# Patient Record
Sex: FEMALE | Race: WHITE | NOT HISPANIC OR LATINO | Employment: PART TIME | URBAN - NONMETROPOLITAN AREA
[De-identification: names, ages, dates, MRNs, and addresses within clinical notes are randomized per-mention and may not be internally consistent; named-entity substitution may affect disease eponyms.]

---

## 2021-02-17 ENCOUNTER — TELEPHONE (OUTPATIENT)
Dept: PAIN MEDICINE | Facility: CLINIC | Age: 51
End: 2021-02-17

## 2021-02-17 ENCOUNTER — CONSULT (OUTPATIENT)
Dept: PAIN MEDICINE | Facility: CLINIC | Age: 51
End: 2021-02-17
Payer: COMMERCIAL

## 2021-02-17 VITALS
BODY MASS INDEX: 23.39 KG/M2 | DIASTOLIC BLOOD PRESSURE: 88 MMHG | RESPIRATION RATE: 20 BRPM | SYSTOLIC BLOOD PRESSURE: 142 MMHG | HEART RATE: 59 BPM | TEMPERATURE: 97.9 F | WEIGHT: 116 LBS | HEIGHT: 59 IN

## 2021-02-17 DIAGNOSIS — F41.9 ANXIETY: Primary | ICD-10-CM

## 2021-02-17 DIAGNOSIS — M54.16 LUMBAR RADICULOPATHY: Primary | ICD-10-CM

## 2021-02-17 PROCEDURE — 99244 OFF/OP CNSLTJ NEW/EST MOD 40: CPT | Performed by: ANESTHESIOLOGY

## 2021-02-17 RX ORDER — CELECOXIB 200 MG/1
200 CAPSULE ORAL 2 TIMES DAILY
Qty: 60 CAPSULE | Refills: 0 | Status: SHIPPED | OUTPATIENT
Start: 2021-02-17 | End: 2021-02-17 | Stop reason: SDUPTHER

## 2021-02-17 RX ORDER — 0.9 % SODIUM CHLORIDE 0.9 %
1 VIAL (ML) INJECTION ONCE
Status: CANCELLED | OUTPATIENT
Start: 2021-02-17 | End: 2021-02-17

## 2021-02-17 RX ORDER — ATENOLOL 50 MG/1
50 TABLET ORAL 2 TIMES DAILY
COMMUNITY
Start: 2020-12-13

## 2021-02-17 RX ORDER — GABAPENTIN 100 MG/1
100 CAPSULE ORAL 3 TIMES DAILY
Qty: 90 CAPSULE | Refills: 0 | Status: SHIPPED | OUTPATIENT
Start: 2021-02-17 | End: 2021-05-14

## 2021-02-17 RX ORDER — CELECOXIB 200 MG/1
200 CAPSULE ORAL 2 TIMES DAILY
Qty: 60 CAPSULE | Refills: 0 | Status: SHIPPED | OUTPATIENT
Start: 2021-02-17 | End: 2021-05-14

## 2021-02-17 RX ORDER — ERGOCALCIFEROL 1.25 MG/1
CAPSULE ORAL
COMMUNITY
Start: 2021-02-13 | End: 2021-05-14

## 2021-02-17 RX ORDER — GABAPENTIN 100 MG/1
100 CAPSULE ORAL 3 TIMES DAILY
Qty: 90 CAPSULE | Refills: 0 | Status: SHIPPED | OUTPATIENT
Start: 2021-02-17 | End: 2021-02-17 | Stop reason: SDUPTHER

## 2021-02-17 RX ORDER — ALPRAZOLAM 0.5 MG/1
0.5 TABLET ORAL ONCE AS NEEDED
Qty: 2 TABLET | Refills: 0 | Status: SHIPPED | OUTPATIENT
Start: 2021-02-17 | End: 2021-05-14

## 2021-02-17 NOTE — PATIENT INSTRUCTIONS
Lumbar Radiculopathy   WHAT YOU NEED TO KNOW:   Lumbar radiculopathy is a painful condition that happens when a nerve in your lumbar spine (lower back) is pinched or irritated  Nerves control feeling and movement in your body  You may have numbness or pain that shoots down from your lower back towards your foot  DISCHARGE INSTRUCTIONS:   Medicines:   · Medicines:     ? NSAIDs , such as ibuprofen, help decrease swelling, pain, and fever  This medicine is available with or without a doctor's order  NSAIDs can cause stomach bleeding or kidney problems in certain people  If you take blood thinner medicine, always ask your healthcare provider if NSAIDs are safe for you  Always read the medicine label and follow directions  ? Muscle relaxers  help decrease pain and muscle spasms  ? Opioids: This is a strong medicine given to reduce severe pain  It is also called narcotic pain medicine  Take this medicine exactly as directed by your healthcare provider  ? Oral steroids: Steroids may also be given to reduce pain and swelling  ? Take your medicine as directed  Contact your healthcare provider if you think your medicine is not helping or if you have side effects  Tell him of her if you are allergic to any medicine  Keep a list of the medicines, vitamins, and herbs you take  Include the amounts, and when and why you take them  Bring the list or the pill bottles to follow-up visits  Carry your medicine list with you in case of an emergency  Follow up with your healthcare provider or spine specialist within 1 to 3 weeks:  After your first follow-up appointment, return to your healthcare provider or spine specialist every 2 weeks until you have healed  Ask for information about physical therapy for your condition  Write down your questions so you remember to ask them during your visits  Physical therapy:  You may need physical therapy to improve your condition   Your physical therapist may teach you certain exercises to improve posture (the way you stand and sit), flexibility, and strength in your lower back  Self care:   · Stay active: It is best to be active when you have lumbar radiculopathy  Your physical therapist or healthcare provider may tell you to take walks to ease yourself back into your daily routine  Avoid long periods of bed rest  Bed rest could worsen your symptoms  Do not move in ways that increase your pain  Ask for more information about the best ways to stay active  · Use ice or heat packs:  Use ice or heat packs as directed on the sore area of your body to decrease the pain and swelling  Put ice in a plastic bag covered with a towel on your low back  Cover heated items with a towel to avoid burns  Use ice and heat as directed  · Avoid heavy lifting: Your condition may worsen if you lift heavy things  Avoid lifting if possible  · Maintain a healthy weight:  Excess body weight may strain your back  Talk with your healthcare provider about ways to lose excess weight if you are overweight  Contact your healthcare provider or spine specialist if:   · Your pain does not improve within 1 to 3 weeks after treatment  · Your pain and weakness keep you from your normal activities at work, home, or school  · You lose more than 10 pounds in 6 months without trying  · You become depressed or sad because of the pain  · You have questions or concerns about your condition or care  Return to the emergency department if:   · You have a fever greater than 100 4°F for longer than 2 days  · You have new, severe back or leg pain, or your pain spreads to both legs  · You have any new signs of numbness or weakness, especially in your lower back, legs, arms, or genital area  · You have new trouble controlling your urine and bowel movements  · You do not feel like your bladder empties when you urinate      © Copyright [employer] 2020 Information is for End User's use only and may not be sold, redistributed or otherwise used for commercial purposes  All illustrations and images included in CareNotes® are the copyrighted property of [employer]  or [employer]  The above information is an  only  It is not intended as medical advice for individual conditions or treatments  Talk to your doctor, nurse or pharmacist before following any medical regimen to see if it is safe and effective for you  Core Strengthening Exercises   WHAT YOU NEED TO KNOW:   Your core includes the muscles of your lower back, hip, pelvis, and abdomen  Core strengthening exercises help heal and strengthen these muscles  This helps prevent another injury, and keeps your pelvis, spine, and hips in the correct position  DISCHARGE INSTRUCTIONS:   Contact your healthcare provider if:   · You have sharp or worsening pain during exercise or at rest     · You have questions or concerns about your shoulder exercises  Safety tips:  Talk to your healthcare provider before you start an exercise program  A physical therapist can teach you how to do core strengthening exercises safely  · Do the exercises on a mat or firm surface  A firm surface will support your spine and prevent low back pain  Do not do these exercises on a bed  · Move slowly and smoothly  Avoid fast or jerky motions  · Stop if you feel pain  Core exercises should not be painful  Stop if you feel pain  · Breathe normally during core exercises  Do not hold your breath  This may cause an increase in blood pressure and prevent muscle strengthening  Your healthcare provider will tell you when to inhale and exhale during the exercise  · Begin all of your exercises with abdominal bracing  Abdominal bracing helps warm up your core muscles  You can also practice abdominal bracing throughout the day  Lie on your back with your knees bent and feet flat on the floor  Place your arms in a relaxed position beside your body  Tighten your abdominal muscles  Pull your belly button in and up toward your spine  Hold for 5 seconds  Relax your muscles  Repeat 10 times  Core strengthening exercises: Your healthcare provider will tell you how often to do these exercises  The provider will also tell you how many repetitions of each exercise you should do  Hold each exercise for 5 seconds or as directed  As you get stronger, increase your hold to 10 to 15 seconds  You can do some of these exercises on a stability ball, or with a weight  Ask your healthcare provider how to use a stability ball or weight for these exercises:  · Bridging:  Lie on your back with your knees bent and feet flat on the floor  Rest your arms at your side  Tighten your buttocks, and then lift your hips 1 inch off the floor  Hold for 5 seconds  When you can do this exercise without pain for 10 seconds, increase the distance you lift your hips  A good goal is to be able to lift your hips so that your shoulders, hips, and knees are in a straight line  · Dead bug:  Lie on your back with your knees bent and feet flat on the floor  Place your arms in a relaxed position beside your body  Begin with abdominal bracing  Next, raise one leg, keeping your knee bent  Hold for 5 seconds  Repeat with the other leg  When you can do this exercise without pain for 10 to 15 seconds, you may raise one straight leg and hold  Repeat with the other leg  · Quadruped:  Place your hands and knees on the floor  Keep your wrists directly below your shoulders and your knees directly below your hips  Pull your belly button in toward your spine  Do not flatten or arch your back  Tighten your abdominal muscles below your belly button  Hold for 5 seconds  When you can do this exercise without pain for 10 to 15 seconds, you may extend one arm and hold  Repeat on the other side           · Side bridge exercises:      ? Standing side bridge:  Stand next to a wall and extend one arm toward the wall  Place your palm flat on the wall with your fingers pointing upward  Begin with abdominal bracing  Next, without moving your feet, slowly bend your arm to 90 degrees  Hold for 5 seconds  Repeat on the other side  When you can do this exercise without pain for 10 to 15 seconds, you may do the bent leg side bridge on the floor  ? Bent leg side bridge:  Lie on one side with your legs, hips, and shoulders in a straight line  Prop yourself up onto your forearm so your elbow is directly below your shoulder  Bend your knees back to 90 degrees  Begin with abdominal bracing  Next, lift your hips and balance yourself on your forearm and knees  Hold for 5 seconds  Repeat on the other side  When you can do this exercise without pain for 10 to 15 seconds, you may do the straight leg side bridge on the floor  ? Straight leg side bridge:  Lie on one side with your legs, hips, and shoulders in a straight line  Prop yourself up onto your forearm so your elbow is directly below your shoulder  Begin with abdominal bracing  Lift your hips off the floor and balance yourself on your forearm and the outside of your flexed foot  Do not let your ankle bend sideways  Hold for 5 seconds  Repeat on the other side  When you can do this exercise without pain for 10 to 15 seconds, ask your healthcare provider for more advanced exercises  · Superman:  Lie on your stomach  Extend your arms forward on the floor  Tighten your abdominal muscles and lift your right hand and left leg off the floor  Hold this position  Slowly return to the starting position  Tighten your abdominal muscles and lift your left hand and right leg off the floor  Hold this position  Slowly return to the starting position  · Clam:  Lie on your side with your knees bent  Put your bottom arm under your head to keep your neck in line  Put your top hand on your hip to keep your pelvis from moving   Put your heels together, and keep them together during this exercise  Slowly raise your top knee toward the ceiling  Then lower your leg so your knees are together  Repeat this exercise 10 times  Then switch sides and do the exercise 10 times with the other leg  · Curl up:  Lie on your back with your knees bent and feet flat on the floor  Place your hands, palms down, underneath your lower back  Next, with your elbows on the floor, lift your shoulders and chest 2 to 3 inches off the floor  Keep your head in line with your shoulders  Hold this position  Slowly return to the starting position  · Straight leg raises:  Lie on your back with one leg straight  Bend the other knee and place your foot flat on the floor  Tighten your abdominal muscles  Keep your leg straight and slowly lift it straight up 6 to 12 inches off the floor  Hold this position  Lower your leg slowly  Do as many repetitions as directed on this side  Repeat with the other leg  · Plank:  Lie on your stomach  Bend your elbows and place your forearms flat on the floor  Lift your chest, stomach, and knees off the floor  Make sure your elbows are below your shoulders  Your body should be in a straight line  Do not let your hips or lower back sink to the ground  Squeeze your abdominal muscles together and hold for 15 seconds  To make this exercise harder, hold for 30 seconds or lift 1 leg at a time  · Bicycles:  Lie on your back  Bend both knees and bring them toward your chest  Your calves should be parallel to the floor  Place the palms of your hands on the back of your head  Straighten your right leg and keep it lifted 2 inches off the floor  Raise your head and shoulders off the floor and twist towards your left  Keep your head and shoulders lifted  Bend your right knee while you straighten your left leg  Keep your left leg 2 inches off the floor  Twist your head and chest towards the left leg   Continue to straighten 1 leg at a time and twist        Follow up with your healthcare provider as directed:  Write down your questions so you remember to ask them during your visits  © Copyright [address] Information is for End User's use only and may not be sold, redistributed or otherwise used for commercial purposes  All illustrations and images included in CareNotes® are the copyrighted property of [employer]  or [employer]  The above information is an  only  It is not intended as medical advice for individual conditions or treatments  Talk to your doctor, nurse or pharmacist before following any medical regimen to see if it is safe and effective for you

## 2021-02-17 NOTE — LETTER
February 18, 2021     Lashay Moe DO  [address] [facility] [address]    Patient: Cathie Henry   YOB: 1970   Date of Visit: 2/17/2021       Dear Dr Emmaline Goodpasture: Thank you for referring Andra Rodrigues to me for evaluation  Below are my notes for this consultation  If you have questions, please do not hesitate to call me  I look forward to following your patient along with you  Sincerely,        Reanna Valente MD        CC: No Recipients  Reanna Valente MD  2/17/2021  4:17 PM  Signed      Assessment  1  Lumbar radiculopathy - Right  -     Ambulatory referral to Physical Therapy; Future  -     celecoxib (CeleBREX) 200 mg capsule; Take 1 capsule (200 mg total) by mouth 2 (two) times a day  -     gabapentin (NEURONTIN) 100 mg capsule; Take 1 capsule (100 mg total) by mouth 3 (three) times a day  -     Case request operating room: BLOCK / INJECTION EPIDURAL STEROID LUMBAR right L3 and L4; Standing  -     Case request operating room: BLOCK / INJECTION EPIDURAL STEROID LUMBAR right L3 and L4  -     MRI lumbar spine wo contrast; Future; Expected date: 02/17/2021    Pain numbness and weakness in right L3 and L4 dermatomal distribution accompanied by debilitating symptomatology of radiculopathy  L4-5 moderate  Central canal stenosis seen on 2018 MRI from [facility]  Bilateral  moderate degree of transformational stenosis seen at this level  Reasonable to employ multimodal pain therapy plan and obtain repeat imaging to determine level of nerve root compression to guide interventional therapy  Plan  - MRI lumbar spine noncontrast  - right L3 and L4 TFESI  -gabapentin 100 mg t i d  Ordered for patient; counseled regarding sedative effects of taking this medication and provided up titration calendar  Counseled not to take medication while driving or operating heavy machinery/using stairs  -Celebrex 200 mg b i d  Prescribed    Patient educated regarding bleeding risk of taking this medication not taking any other nonsteroidal anti-inflammatory medications while taking this medication  -physical therapy for right-sided lumbar radiculopathy    There are risks associated with opioid medications, including dependence, addiction and tolerance  The patient understands and agrees to use these medications only as prescribed  Potential side effects of the medications include, but are not limited to, constipation, drowsiness, addiction, impaired judgment and risk of fatal overdose if not taken as prescribed  The patient was warned against driving while taking sedation medications  Sharing medications is a felony  At this point in time, the patient is showing no signs of addiction, abuse, diversion or suicidal ideation  [place] Prescription Drug Monitoring Program report was reviewed and was appropriate     Complete risks and benefits including bleeding, infection, tissue reaction, nerve injury and allergic reaction were discussed  The approach was demonstrated using models and literature was provided  Verbal and written consent was obtained  My impressions and treatment recommendations were discussed in detail with the patient who verbalized understanding and had no further questions  Discharge instructions were provided  I personally saw and examined the patient and I agree with the above discussed plan of care      New Medications Ordered This Visit   Medications    atenolol (TENORMIN) 50 mg tablet     Sig: Take 50 mg by mouth 2 (two) times a day    ergocalciferol (VITAMIN D2) 50,000 units    celecoxib (CeleBREX) 200 mg capsule     Sig: Take 1 capsule (200 mg total) by mouth 2 (two) times a day     Dispense:  60 capsule     Refill:  0    gabapentin (NEURONTIN) 100 mg capsule     Sig: Take 1 capsule (100 mg total) by mouth 3 (three) times a day     Dispense:  90 capsule     Refill:  0       History of Present Illness     Ellen Atkinson is a 46 y o  female  With past medical history of  COPD, hypertension (1/2 pack per day smoker) presenting with  progressive right-sided lumbar radiculopathy for the past 3 years  She presents with pain in the right L3 and L4 dermatomal distributions  Debilitating weakness numbness and paresthesias accompany the pain  The patient rates the pain at a 8/10 accompanied by electric shock-like shooting features and crampy burning pain in the aforementioned dermatomal distributions  The pain is worse in the mornings as well as the end of the day; exertion such as walking for long periods of time seems to exacerbate the pain  The patient can hardly walk more than a few blocks without having debilitating pain  She tries to maintain an active lifestyle and finds that the current degree of pain seems to compromises her efforts  The pain significantly impacts independent activities of daily living and contributes to significant disability  She has attempted a course of physical therapy 2 years ago with exacerbation of the pain  She has taken naproxen with limited relief of the pain as well    she has had multiple epidural steroid injections in the past with approximately 4 months benefit at a time  She denies any bowel or bladder dysfunction/incontinence    I have personally reviewed and/or updated the patient's past medical history, past surgical history, family history, social history, current medications, allergies, and vital signs today  Review of Systems   Constitutional: Positive for activity change  HENT: Negative  Eyes: Negative  Respiratory: Negative  Cardiovascular: Negative  Gastrointestinal: Negative  Endocrine: Negative  Genitourinary: Negative  Musculoskeletal: Positive for arthralgias, back pain, gait problem and myalgias  Skin: Negative  Allergic/Immunologic: Negative  Neurological: Positive for weakness and numbness  Hematological: Negative  Psychiatric/Behavioral: Negative      All other systems reviewed and are negative  Patient Active Problem List   Diagnosis    Lumbar radiculopathy - Right       Past Medical History:   Diagnosis Date    Hypertension        Past Surgical History:   Procedure Laterality Date    GALLBLADDER SURGERY  2017       Family History   Problem Relation Age of Onset    Coronary artery disease Mother     Cancer Father        Social History     Occupational History    Not on file   Tobacco Use    Smoking status: Current Every Day Smoker     Packs/day: 0 50     Years: 30 00     Pack years: 15 00    Smokeless tobacco: Never Used   Substance and Sexual Activity    Alcohol use: Not Currently     Frequency: Never    Drug use: Not on file    Sexual activity: Not on file       Current Outpatient Medications on File Prior to Visit   Medication Sig    atenolol (TENORMIN) 50 mg tablet Take 50 mg by mouth 2 (two) times a day    ergocalciferol (VITAMIN D2) 50,000 units      No current facility-administered medications on file prior to visit  Allergies   Allergen Reactions    Percocet [Oxycodone-Acetaminophen] GI Intolerance         Physical Exam    /88   Pulse 59   Temp 97 9 °F (36 6 °C)   Resp 20   Ht 4' 11 25" (1 505 m)   Wt 52 6 kg (116 lb)   BMI 23 23 kg/m²     Constitutional: normal, well developed, well nourished, alert, in no distress and non-toxic and no overt pain behavior  Eyes: anicteric  HEENT: grossly intact  Neck: supple, symmetric, trachea midline and no masses   Pulmonary:even and unlabored  Cardiovascular:No edema or pitting edema present  Skin:Normal without rashes or lesions and well hydrated  Psychiatric:Mood and affect appropriate  Neurologic:Cranial Nerves II-XII grossly intact Sensation grossly intact; no clonus negative campos's  Reflexes 2+ and brisk  SLR weakly positive right-sided  Musculoskeletal: antalgic gait  Normal heel toe and tip toe walking  Pain limited right hip flexion/  Right knee extension    5/5 strength otherwise in all other extremities bilaterally with active range of motion movements  Significant pain with lumbar facet loading bilaterally and with lateral spine rotation right greater than left  ttp over lumbar paraspinal muscles  Negative ivory's test, negative gaenslen's negative SIJ loading bilaterally  Imaging     MRI report of lumbar spine reviewed from [facility] medical imaging showing L4-5 moderate central canal stenosis and moderate degree of transforaminal stenosis bilaterally

## 2021-02-17 NOTE — TELEPHONE ENCOUNTER
----- Message from Irais Moy sent at 2/17/2021  3:55 PM EST -----  Patient is requesting script for nerves for MRI day only

## 2021-02-17 NOTE — TELEPHONE ENCOUNTER
Patients Mri is scheduled for 02/23/2021 at [facility] this needs authorization-thanks

## 2021-02-17 NOTE — PROGRESS NOTES
Assessment  1  Lumbar radiculopathy - Right  -     Ambulatory referral to Physical Therapy; Future  -     celecoxib (CeleBREX) 200 mg capsule; Take 1 capsule (200 mg total) by mouth 2 (two) times a day  -     gabapentin (NEURONTIN) 100 mg capsule; Take 1 capsule (100 mg total) by mouth 3 (three) times a day  -     Case request operating room: BLOCK / INJECTION EPIDURAL STEROID LUMBAR right L3 and L4; Standing  -     Case request operating room: BLOCK / INJECTION EPIDURAL STEROID LUMBAR right L3 and L4  -     MRI lumbar spine wo contrast; Future; Expected date: 02/17/2021    Pain numbness and weakness in right L3 and L4 dermatomal distribution accompanied by debilitating symptomatology of radiculopathy  L4-5 moderate  Central canal stenosis seen on 2018 MRI from [facility]  Bilateral  moderate degree of transformational stenosis seen at this level  Reasonable to employ multimodal pain therapy plan and obtain repeat imaging to determine level of nerve root compression to guide interventional therapy  Plan  - MRI lumbar spine noncontrast  - right L3 and L4 TFESI  -gabapentin 100 mg t i d  Ordered for patient; counseled regarding sedative effects of taking this medication and provided up titration calendar  Counseled not to take medication while driving or operating heavy machinery/using stairs  -Celebrex 200 mg b i d  Prescribed  Patient educated regarding bleeding risk of taking this medication not taking any other nonsteroidal anti-inflammatory medications while taking this medication  -physical therapy for right-sided lumbar radiculopathy    There are risks associated with opioid medications, including dependence, addiction and tolerance  The patient understands and agrees to use these medications only as prescribed   Potential side effects of the medications include, but are not limited to, constipation, drowsiness, addiction, impaired judgment and risk of fatal overdose if not taken as prescribed  The patient was warned against driving while taking sedation medications  Sharing medications is a felony  At this point in time, the patient is showing no signs of addiction, abuse, diversion or suicidal ideation  [place] Prescription Drug Monitoring Program report was reviewed and was appropriate     Complete risks and benefits including bleeding, infection, tissue reaction, nerve injury and allergic reaction were discussed  The approach was demonstrated using models and literature was provided  Verbal and written consent was obtained  My impressions and treatment recommendations were discussed in detail with the patient who verbalized understanding and had no further questions  Discharge instructions were provided  I personally saw and examined the patient and I agree with the above discussed plan of care  New Medications Ordered This Visit   Medications    atenolol (TENORMIN) 50 mg tablet     Sig: Take 50 mg by mouth 2 (two) times a day    ergocalciferol (VITAMIN D2) 50,000 units    celecoxib (CeleBREX) 200 mg capsule     Sig: Take 1 capsule (200 mg total) by mouth 2 (two) times a day     Dispense:  60 capsule     Refill:  0    gabapentin (NEURONTIN) 100 mg capsule     Sig: Take 1 capsule (100 mg total) by mouth 3 (three) times a day     Dispense:  90 capsule     Refill:  0       History of Present Illness     Sade Whyte is a 46 y o  female  With past medical history of  COPD, hypertension (1/2 pack per day smoker) presenting with  progressive right-sided lumbar radiculopathy for the past 3 years  She presents with pain in the right L3 and L4 dermatomal distributions  Debilitating weakness numbness and paresthesias accompany the pain  The patient rates the pain at a 8/10 accompanied by electric shock-like shooting features and crampy burning pain in the aforementioned dermatomal distributions    The pain is worse in the mornings as well as the end of the day; exertion such as walking for long periods of time seems to exacerbate the pain  The patient can hardly walk more than a few blocks without having debilitating pain  She tries to maintain an active lifestyle and finds that the current degree of pain seems to compromises her efforts  The pain significantly impacts independent activities of daily living and contributes to significant disability  She has attempted a course of physical therapy 2 years ago with exacerbation of the pain  She has taken naproxen with limited relief of the pain as well    she has had multiple epidural steroid injections in the past with approximately 4 months benefit at a time  She denies any bowel or bladder dysfunction/incontinence    I have personally reviewed and/or updated the patient's past medical history, past surgical history, family history, social history, current medications, allergies, and vital signs today  Review of Systems   Constitutional: Positive for activity change  HENT: Negative  Eyes: Negative  Respiratory: Negative  Cardiovascular: Negative  Gastrointestinal: Negative  Endocrine: Negative  Genitourinary: Negative  Musculoskeletal: Positive for arthralgias, back pain, gait problem and myalgias  Skin: Negative  Allergic/Immunologic: Negative  Neurological: Positive for weakness and numbness  Hematological: Negative  Psychiatric/Behavioral: Negative  All other systems reviewed and are negative        Patient Active Problem List   Diagnosis    Lumbar radiculopathy - Right       Past Medical History:   Diagnosis Date    Hypertension        Past Surgical History:   Procedure Laterality Date    GALLBLADDER SURGERY  2017       Family History   Problem Relation Age of Onset    Coronary artery disease Mother     Cancer Father        Social History     Occupational History    Not on file   Tobacco Use    Smoking status: Current Every Day Smoker     Packs/day: 0 50     Years: 30 00     Pack years: 15 00    Smokeless tobacco: Never Used   Substance and Sexual Activity    Alcohol use: Not Currently     Frequency: Never    Drug use: Not on file    Sexual activity: Not on file       Current Outpatient Medications on File Prior to Visit   Medication Sig    atenolol (TENORMIN) 50 mg tablet Take 50 mg by mouth 2 (two) times a day    ergocalciferol (VITAMIN D2) 50,000 units      No current facility-administered medications on file prior to visit  Allergies   Allergen Reactions    Percocet [Oxycodone-Acetaminophen] GI Intolerance         Physical Exam    /88   Pulse 59   Temp 97 9 °F (36 6 °C)   Resp 20   Ht 4' 11 25" (1 505 m)   Wt 52 6 kg (116 lb)   BMI 23 23 kg/m²     Constitutional: normal, well developed, well nourished, alert, in no distress and non-toxic and no overt pain behavior  Eyes: anicteric  HEENT: grossly intact  Neck: supple, symmetric, trachea midline and no masses   Pulmonary:even and unlabored  Cardiovascular:No edema or pitting edema present  Skin:Normal without rashes or lesions and well hydrated  Psychiatric:Mood and affect appropriate  Neurologic:Cranial Nerves II-XII grossly intact Sensation grossly intact; no clonus negative campos's  Reflexes 2+ and brisk  SLR weakly positive right-sided  Musculoskeletal: antalgic gait  Normal heel toe and tip toe walking  Pain limited right hip flexion/  Right knee extension  5/5 strength otherwise in all other extremities bilaterally with active range of motion movements  Significant pain with lumbar facet loading bilaterally and with lateral spine rotation right greater than left  ttp over lumbar paraspinal muscles  Negative ivory's test, negative gaenslen's negative SIJ loading bilaterally  Imaging     MRI report of lumbar spine reviewed from [facility] medical imaging showing L4-5 moderate central canal stenosis and moderate degree of transforaminal stenosis bilaterally

## 2021-02-17 NOTE — H&P (VIEW-ONLY)
Assessment  1  Lumbar radiculopathy - Right  -     Ambulatory referral to Physical Therapy; Future  -     celecoxib (CeleBREX) 200 mg capsule; Take 1 capsule (200 mg total) by mouth 2 (two) times a day  -     gabapentin (NEURONTIN) 100 mg capsule; Take 1 capsule (100 mg total) by mouth 3 (three) times a day  -     Case request operating room: BLOCK / INJECTION EPIDURAL STEROID LUMBAR right L3 and L4; Standing  -     Case request operating room: BLOCK / INJECTION EPIDURAL STEROID LUMBAR right L3 and L4  -     MRI lumbar spine wo contrast; Future; Expected date: 02/17/2021    Pain numbness and weakness in right L3 and L4 dermatomal distribution accompanied by debilitating symptomatology of radiculopathy  L4-5 moderate  Central canal stenosis seen on 2018 MRI from [facility]  Bilateral  moderate degree of transformational stenosis seen at this level  Reasonable to employ multimodal pain therapy plan and obtain repeat imaging to determine level of nerve root compression to guide interventional therapy  Plan  - MRI lumbar spine noncontrast  - right L3 and L4 TFESI  -gabapentin 100 mg t i d  Ordered for patient; counseled regarding sedative effects of taking this medication and provided up titration calendar  Counseled not to take medication while driving or operating heavy machinery/using stairs  -Celebrex 200 mg b i d  Prescribed  Patient educated regarding bleeding risk of taking this medication not taking any other nonsteroidal anti-inflammatory medications while taking this medication  -physical therapy for right-sided lumbar radiculopathy    There are risks associated with opioid medications, including dependence, addiction and tolerance  The patient understands and agrees to use these medications only as prescribed   Potential side effects of the medications include, but are not limited to, constipation, drowsiness, addiction, impaired judgment and risk of fatal overdose if not taken as prescribed  The patient was warned against driving while taking sedation medications  Sharing medications is a felony  At this point in time, the patient is showing no signs of addiction, abuse, diversion or suicidal ideation  [place] Prescription Drug Monitoring Program report was reviewed and was appropriate     Complete risks and benefits including bleeding, infection, tissue reaction, nerve injury and allergic reaction were discussed  The approach was demonstrated using models and literature was provided  Verbal and written consent was obtained  My impressions and treatment recommendations were discussed in detail with the patient who verbalized understanding and had no further questions  Discharge instructions were provided  I personally saw and examined the patient and I agree with the above discussed plan of care  New Medications Ordered This Visit   Medications    atenolol (TENORMIN) 50 mg tablet     Sig: Take 50 mg by mouth 2 (two) times a day    ergocalciferol (VITAMIN D2) 50,000 units    celecoxib (CeleBREX) 200 mg capsule     Sig: Take 1 capsule (200 mg total) by mouth 2 (two) times a day     Dispense:  60 capsule     Refill:  0    gabapentin (NEURONTIN) 100 mg capsule     Sig: Take 1 capsule (100 mg total) by mouth 3 (three) times a day     Dispense:  90 capsule     Refill:  0       History of Present Illness     Cami Smith is a 46 y o  female  With past medical history of  COPD, hypertension (1/2 pack per day smoker) presenting with  progressive right-sided lumbar radiculopathy for the past 3 years  She presents with pain in the right L3 and L4 dermatomal distributions  Debilitating weakness numbness and paresthesias accompany the pain  The patient rates the pain at a 8/10 accompanied by electric shock-like shooting features and crampy burning pain in the aforementioned dermatomal distributions    The pain is worse in the mornings as well as the end of the day; exertion such as walking for long periods of time seems to exacerbate the pain  The patient can hardly walk more than a few blocks without having debilitating pain  She tries to maintain an active lifestyle and finds that the current degree of pain seems to compromises her efforts  The pain significantly impacts independent activities of daily living and contributes to significant disability  She has attempted a course of physical therapy 2 years ago with exacerbation of the pain  She has taken naproxen with limited relief of the pain as well    she has had multiple epidural steroid injections in the past with approximately 4 months benefit at a time  She denies any bowel or bladder dysfunction/incontinence    I have personally reviewed and/or updated the patient's past medical history, past surgical history, family history, social history, current medications, allergies, and vital signs today  Review of Systems   Constitutional: Positive for activity change  HENT: Negative  Eyes: Negative  Respiratory: Negative  Cardiovascular: Negative  Gastrointestinal: Negative  Endocrine: Negative  Genitourinary: Negative  Musculoskeletal: Positive for arthralgias, back pain, gait problem and myalgias  Skin: Negative  Allergic/Immunologic: Negative  Neurological: Positive for weakness and numbness  Hematological: Negative  Psychiatric/Behavioral: Negative  All other systems reviewed and are negative        Patient Active Problem List   Diagnosis    Lumbar radiculopathy - Right       Past Medical History:   Diagnosis Date    Hypertension        Past Surgical History:   Procedure Laterality Date    GALLBLADDER SURGERY  2017       Family History   Problem Relation Age of Onset    Coronary artery disease Mother     Cancer Father        Social History     Occupational History    Not on file   Tobacco Use    Smoking status: Current Every Day Smoker     Packs/day: 0 50     Years: 30 00     Pack years: 15 00    Smokeless tobacco: Never Used   Substance and Sexual Activity    Alcohol use: Not Currently     Frequency: Never    Drug use: Not on file    Sexual activity: Not on file       Current Outpatient Medications on File Prior to Visit   Medication Sig    atenolol (TENORMIN) 50 mg tablet Take 50 mg by mouth 2 (two) times a day    ergocalciferol (VITAMIN D2) 50,000 units      No current facility-administered medications on file prior to visit  Allergies   Allergen Reactions    Percocet [Oxycodone-Acetaminophen] GI Intolerance         Physical Exam    /88   Pulse 59   Temp 97 9 °F (36 6 °C)   Resp 20   Ht 4' 11 25" (1 505 m)   Wt 52 6 kg (116 lb)   BMI 23 23 kg/m²     Constitutional: normal, well developed, well nourished, alert, in no distress and non-toxic and no overt pain behavior  Eyes: anicteric  HEENT: grossly intact  Neck: supple, symmetric, trachea midline and no masses   Pulmonary:even and unlabored  Cardiovascular:No edema or pitting edema present  Skin:Normal without rashes or lesions and well hydrated  Psychiatric:Mood and affect appropriate  Neurologic:Cranial Nerves II-XII grossly intact Sensation grossly intact; no clonus negative campos's  Reflexes 2+ and brisk  SLR weakly positive right-sided  Musculoskeletal: antalgic gait  Normal heel toe and tip toe walking  Pain limited right hip flexion/  Right knee extension  5/5 strength otherwise in all other extremities bilaterally with active range of motion movements  Significant pain with lumbar facet loading bilaterally and with lateral spine rotation right greater than left  ttp over lumbar paraspinal muscles  Negative ivory's test, negative gaenslen's negative SIJ loading bilaterally  Imaging     MRI report of lumbar spine reviewed from [facility] medical imaging showing L4-5 moderate central canal stenosis and moderate degree of transforaminal stenosis bilaterally

## 2021-02-22 NOTE — TELEPHONE ENCOUNTER
RN s/w pt she wanted to make sure it was ok for her to take her celebrex tomorrow before her MRI b/c she will also be taking a sedative for the MRI  RN explained to the pt that it is ok to take the celebrex

## 2021-02-22 NOTE — TELEPHONE ENCOUNTER
Pt wants to know if she should take her celecoxib (CeleBREX) 200 mg capsule before her MRI tomorrow   Thank you        linn [phone]

## 2021-02-23 ENCOUNTER — HOSPITAL ENCOUNTER (OUTPATIENT)
Dept: MRI IMAGING | Facility: HOSPITAL | Age: 51
Discharge: HOME | End: 2021-02-23
Attending: ANESTHESIOLOGY
Payer: COMMERCIAL

## 2021-02-23 DIAGNOSIS — M54.16 LUMBAR RADICULOPATHY: ICD-10-CM

## 2021-02-23 PROCEDURE — G1004 CDSM NDSC: HCPCS

## 2021-02-23 PROCEDURE — 72148 MRI LUMBAR SPINE W/O DYE: CPT

## 2021-03-04 ENCOUNTER — HOSPITAL ENCOUNTER (OUTPATIENT)
Facility: HOSPITAL | Age: 51
Setting detail: OUTPATIENT SURGERY
Discharge: HOME | End: 2021-03-04
Attending: ANESTHESIOLOGY | Admitting: ANESTHESIOLOGY
Payer: COMMERCIAL

## 2021-03-04 ENCOUNTER — APPOINTMENT (OUTPATIENT)
Dept: RADIOLOGY | Facility: HOSPITAL | Age: 51
End: 2021-03-04
Payer: COMMERCIAL

## 2021-03-04 VITALS
HEART RATE: 72 BPM | OXYGEN SATURATION: 99 % | SYSTOLIC BLOOD PRESSURE: 132 MMHG | TEMPERATURE: 97.8 F | BODY MASS INDEX: 21.77 KG/M2 | DIASTOLIC BLOOD PRESSURE: 98 MMHG | HEIGHT: 59 IN | RESPIRATION RATE: 18 BRPM | WEIGHT: 108 LBS

## 2021-03-04 PROCEDURE — 76000 FLUOROSCOPY <1 HR PHYS/QHP: CPT

## 2021-03-04 PROCEDURE — 64484 NJX AA&/STRD TFRM EPI L/S EA: CPT | Performed by: ANESTHESIOLOGY

## 2021-03-04 PROCEDURE — 64483 NJX AA&/STRD TFRM EPI L/S 1: CPT | Performed by: ANESTHESIOLOGY

## 2021-03-04 RX ORDER — 0.9 % SODIUM CHLORIDE 0.9 %
1 VIAL (ML) INJECTION ONCE
Status: COMPLETED | OUTPATIENT
Start: 2021-03-04 | End: 2021-03-04

## 2021-03-04 RX ORDER — LIDOCAINE HYDROCHLORIDE 10 MG/ML
INJECTION, SOLUTION EPIDURAL; INFILTRATION; INTRACAUDAL; PERINEURAL AS NEEDED
Status: DISCONTINUED | OUTPATIENT
Start: 2021-03-04 | End: 2021-03-04 | Stop reason: HOSPADM

## 2021-03-04 RX ORDER — ALPRAZOLAM 0.5 MG/1
0.5 TABLET ORAL ONCE
Status: COMPLETED | OUTPATIENT
Start: 2021-03-04 | End: 2021-03-04

## 2021-03-04 RX ADMIN — ALPRAZOLAM 0.5 MG: 0.5 TABLET ORAL at 08:09

## 2021-03-04 NOTE — DISCHARGE INSTRUCTIONS
PLEASE SCHEDULE 2 WEEK FOLLOW UP BY CALLING THE [facility] [place]: [phone]      Epidural Steroid Injection   AMBULATORY CARE:   What you need to know about an epidural steroid injection (JOSE):  An JOSE is a procedure to inject steroid medicine into the epidural space  The epidural space is between your spinal cord and vertebrae  Steroids reduce inflammation and fluid buildup in your spine that may be causing pain  You may be given pain medicine along with the steroids  How to prepare for an JOSE:  Your healthcare provider will talk to you about how to prepare for your procedure  He or she will tell you what medicines to take or not take on the day of your procedure  You may need to stop taking blood thinners or other medicines several days before your procedure  You may need to adjust any diabetes medicine you take on the day of your procedure  Steroid medicine can increase your blood sugar level  Arrange for someone to drive you home when you are discharged  What will happen during an JOSE:   · You will be given medicine to numb the procedure area  You will be awake for the procedure, but you will not feel pain  You may also be given medicine to help you relax  Contrast liquid will be used to help your healthcare provider see the area better  Tell the healthcare provider if you have ever had an allergic reaction to contrast liquid  · Your healthcare provider may place the needle into your neck area, middle of your back, or tailbone area  He may inject the medicine next to the nerves that are causing your pain  He may instead inject the medicine into a larger area of the epidural space  This helps the medicine spread to more nerves  Your healthcare provider will use a fluoroscope to help guide the needle to the right place  A fluoroscope is a type of x-ray   After the procedure, a bandage will be placed over the injection site to prevent infection  What will happen after an JOSE:  You will have a bandage over the injection site to prevent infection  Your healthcare provider will tell you when you can bathe and any activity guidelines  You will be able to go home  Risks of an JOSE:  You may have temporary or permanent nerve damage or paralysis  You may have bleeding or develop a serious infection, such as meningitis (swelling of the brain coverings)  An abscess may also develop  An abscess is a pus-filled area under the skin  You may need surgery to fix the abscess  You may have a seizure, anxiety, or trouble sleeping  If you are a man, you may have temporary erectile dysfunction (not able to have an erection)  Call your local emergency number (911 in the [address] [facility]) if:   · You have a seizure  · You have trouble moving your legs  Seek care immediately if:   · Blood soaks through your bandage  · You have a fever or chills, severe back pain, and the procedure area is sensitive to the touch  · You cannot control when you urinate or have a bowel movement  Call your doctor if:   · You have weakness or numbness in your legs  · Your wound is red, swollen, or draining pus  · You have nausea or are vomiting  · Your face or neck is red and you feel warm  · You have more pain than you had before the procedure  · You have swelling in your hands or feet  · You have questions or concerns about your condition or care  Care for your wound as directed: You may remove the bandage before you go to bed the day of your procedure  You may take a shower, but do not take a bath for at least 24 hours  Self-care:   · Do not drive,  use machines, or do strenuous activity for 24 hours after your procedure or as directed  · Continue other treatments  as directed  Steroid injections alone will not control your pain  The injections are meant to be used with other treatments, such as physical therapy      Follow up with your doctor as directed:  Write down your questions so you remember to ask them during your visits  © Copyright [address] Information is for End User's use only and may not be sold, redistributed or otherwise used for commercial purposes  All illustrations and images included in CareNotes® are the copyrighted property of A D A M [employer] Inc  or [employer]  The above information is an  only  It is not intended as medical advice for individual conditions or treatments  Talk to your doctor, nurse or pharmacist before following any medical regimen to see if it is safe and effective for you

## 2021-03-04 NOTE — INTERVAL H&P NOTE
H&P reviewed  After examining the patient I find no changes in the patients condition since the H&P had been written      Vitals:    03/04/21 0802   BP: (!) 148/104   Pulse: 71   Resp: 20   Temp: 98 1 °F (36 7 °C)   SpO2: 99%

## 2021-03-04 NOTE — OP NOTE
OPERATIVE REPORT  PATIENT NAME: Liz Robbins    :  1970  MRN: [id number]  Pt Location:  GI ROOM 01    SURGERY DATE: 3/4/2021    Surgeon(s) and Role:      Vadim Alvarado MD - Primary    Preop Diagnosis:  Lumbar radiculopathy [M54 16]    Post-Op Diagnosis Codes:     * Lumbar radiculopathy [M54 16]    Procedure(s) (LRB):  L3 and L4 TRANSFORAMINAL EPIDURAL STEROID INJECTION (Right)    Specimen(s):  * No specimens in log *    Estimated Blood Loss:   Minimal    Drains:  * No LDAs found *    Anesthesia Type:   Local    Operative Indications:  Lumbar radiculopathy [M54 16]    Operative Findings:  Right L3 and L4 nerve roots identified under fluoroscopic guidance with contrast    Complications:   None    Procedure and Technique:  Please see detailed procedure note     I was present for the entire procedure    Patient Disposition:  PACU     SIGNATURE: Homer Bowman MD  DATE: 2021  TIME: 8:57 AM

## 2021-03-04 NOTE — NURSING NOTE
Patient was being discharged and walking to the front entrance  Patient then started to complain of numbness to left leg that she didn't have prior to the procedure  Patient brought back in to talk to Dr Светлана De Los Santos  He examined her and felt that she was OK    Patient was then discharged via wheelchair to front entrance and assisted to car

## 2021-03-04 NOTE — PROCEDURES
Pre-procedure Diagnosis: Lumbar Radiculopathy   Post-procedure Diagnosis: Lumbar Radiculopathy   Procedure Title(s):  [Right L3 and L4 TRANSFORAMINAL EPIDURAL STEROID INJECTION]  Attending Surgeon:   Olimpia Lynch MD  Anesthesia:   Local     Indications: The patient is a 46y o  year-old female with a diagnosis of No diagnosis found  The patient's history and physical exam were reviewed  The risks, benefits and alternatives to the procedure were discussed, and all questions were answered to the patient's satisfaction  The patient agreed to proceed, and written informed consent was obtained  Procedure in Detail: The patient was brought into the procedure room and placed in the prone position on the fluoroscopy table  The area of the lumbar spine was prepped with chloraprep solution  then draped in a sterile manner  The [L3] vertebral body was identified with AP fluoroscopy  An oblique view to the [RIGHT] was obtained to better visualize the inferior junction of the pedicle and transverse process  The 6 o'clock position of the pedicle was marked and identified  The skin and subcutaneous tissues in the area were anesthetized with 1% lidocaine  A 22-gauge, [3/5/7] inch needle was directed toward the targeted point under fluoroscopy until bone was contacted  The needle was then walked inferiorly until the neural foramen was entered  A lateral fluoroscopic view was then used to place the needle tip at the 10 o'clock position of the foramen  The [L4] vertebral body was identified with AP fluoroscopy  An oblique view to the [RIGHT] was obtained to better visualize the inferior junction of the pedicle and transverse process  The 6 o'clock position of the pedicle was marked and identified  The skin and subcutaneous tissues in the area were anesthetized with 1% lidocaine  A 22-gauge, [3/5/7] inch needle was directed toward the targeted point under fluoroscopy until bone was contacted   The needle was then walked inferiorly until the neural foramen was entered  A lateral fluoroscopic view was then used to place the needle tip at the 10 o'clock position of the foramen  Negative aspiration was confirmed, and 1 ml Omnipaque 240 was injected at each level  Appropriate neurograms were observed under AP fluoroscopy  Digital subtraction angiography was performed showing no vascular uptake and appropriate spread in the epidural space  Then, after negative aspiration, a solution consisting of [0 5mL] normal saline and [2mL] celestone (6mg/mL) was easily injected at each level  The needles were removed with a 1% lidocaine flush  The patient's back was cleaned and a bandage was placed over the needle insertion points  Disposition: The patient tolerated the procedure well, and there were no apparent complications  The patient was taken to the recovery area where written discharge instructions for the procedure were given        Estimated Blood Loss: None  Specimens Obtained: N/A

## 2021-03-05 ENCOUNTER — APPOINTMENT (EMERGENCY)
Dept: MRI IMAGING | Facility: HOSPITAL | Age: 51
End: 2021-03-05
Payer: COMMERCIAL

## 2021-03-05 ENCOUNTER — HOSPITAL ENCOUNTER (EMERGENCY)
Facility: HOSPITAL | Age: 51
Discharge: HOME | End: 2021-03-05
Attending: EMERGENCY MEDICINE
Payer: COMMERCIAL

## 2021-03-05 VITALS
HEART RATE: 75 BPM | DIASTOLIC BLOOD PRESSURE: 104 MMHG | WEIGHT: 117.06 LBS | TEMPERATURE: 98.4 F | RESPIRATION RATE: 20 BRPM | BODY MASS INDEX: 23.64 KG/M2 | SYSTOLIC BLOOD PRESSURE: 148 MMHG | OXYGEN SATURATION: 98 %

## 2021-03-05 DIAGNOSIS — R53.1 WEAKNESS: Primary | ICD-10-CM

## 2021-03-05 DIAGNOSIS — M54.16 LUMBAR RADICULOPATHY: ICD-10-CM

## 2021-03-05 DIAGNOSIS — M54.16 LUMBAR RADICULOPATHY: Primary | ICD-10-CM

## 2021-03-05 LAB
ALBUMIN SERPL BCP-MCNC: 4.1 G/DL (ref 3.5–5)
ALP SERPL-CCNC: 74 U/L (ref 46–116)
ALT SERPL W P-5'-P-CCNC: 25 U/L (ref 12–78)
ANION GAP SERPL CALCULATED.3IONS-SCNC: 7 MMOL/L (ref 4–13)
AST SERPL W P-5'-P-CCNC: 20 U/L (ref 5–45)
BASOPHILS # BLD AUTO: 0.02 THOUSANDS/ΜL (ref 0–0.1)
BASOPHILS NFR BLD AUTO: 0 % (ref 0–1)
BILIRUB SERPL-MCNC: 0.27 MG/DL (ref 0.2–1)
BUN SERPL-MCNC: 20 MG/DL (ref 5–25)
CALCIUM SERPL-MCNC: 10.3 MG/DL (ref 8.3–10.1)
CHLORIDE SERPL-SCNC: 100 MMOL/L (ref 100–108)
CO2 SERPL-SCNC: 28 MMOL/L (ref 21–32)
CREAT SERPL-MCNC: 0.81 MG/DL (ref 0.6–1.3)
EOSINOPHIL # BLD AUTO: 0 THOUSAND/ΜL (ref 0–0.61)
EOSINOPHIL NFR BLD AUTO: 0 % (ref 0–6)
ERYTHROCYTE [DISTWIDTH] IN BLOOD BY AUTOMATED COUNT: 13 % (ref 11.6–15.1)
GFR SERPL CREATININE-BSD FRML MDRD: 84 ML/MIN/1.73SQ M
GLUCOSE SERPL-MCNC: 133 MG/DL (ref 65–140)
HCT VFR BLD AUTO: 46.2 % (ref 34.8–46.1)
HGB BLD-MCNC: 15.5 G/DL (ref 11.5–15.4)
IMM GRANULOCYTES # BLD AUTO: 0.07 THOUSAND/UL (ref 0–0.2)
IMM GRANULOCYTES NFR BLD AUTO: 0 % (ref 0–2)
LYMPHOCYTES # BLD AUTO: 1.54 THOUSANDS/ΜL (ref 0.6–4.47)
LYMPHOCYTES NFR BLD AUTO: 9 % (ref 14–44)
MCH RBC QN AUTO: 30.9 PG (ref 26.8–34.3)
MCHC RBC AUTO-ENTMCNC: 33.5 G/DL (ref 31.4–37.4)
MCV RBC AUTO: 92 FL (ref 82–98)
MONOCYTES # BLD AUTO: 0.6 THOUSAND/ΜL (ref 0.17–1.22)
MONOCYTES NFR BLD AUTO: 4 % (ref 4–12)
NEUTROPHILS # BLD AUTO: 14.36 THOUSANDS/ΜL (ref 1.85–7.62)
NEUTS SEG NFR BLD AUTO: 87 % (ref 43–75)
NRBC BLD AUTO-RTO: 0 /100 WBCS
PLATELET # BLD AUTO: 326 THOUSANDS/UL (ref 149–390)
PMV BLD AUTO: 10.1 FL (ref 8.9–12.7)
POTASSIUM SERPL-SCNC: 4.1 MMOL/L (ref 3.5–5.3)
PROT SERPL-MCNC: 8.6 G/DL (ref 6.4–8.2)
RBC # BLD AUTO: 5.02 MILLION/UL (ref 3.81–5.12)
SODIUM SERPL-SCNC: 135 MMOL/L (ref 136–145)
WBC # BLD AUTO: 16.59 THOUSAND/UL (ref 4.31–10.16)

## 2021-03-05 PROCEDURE — 99285 EMERGENCY DEPT VISIT HI MDM: CPT

## 2021-03-05 PROCEDURE — A9585 GADOBUTROL INJECTION: HCPCS | Performed by: EMERGENCY MEDICINE

## 2021-03-05 PROCEDURE — 72158 MRI LUMBAR SPINE W/O & W/DYE: CPT

## 2021-03-05 PROCEDURE — 36415 COLL VENOUS BLD VENIPUNCTURE: CPT | Performed by: EMERGENCY MEDICINE

## 2021-03-05 PROCEDURE — 93005 ELECTROCARDIOGRAM TRACING: CPT

## 2021-03-05 PROCEDURE — 80053 COMPREHEN METABOLIC PANEL: CPT | Performed by: EMERGENCY MEDICINE

## 2021-03-05 PROCEDURE — 99213 OFFICE O/P EST LOW 20 MIN: CPT | Performed by: ANESTHESIOLOGY

## 2021-03-05 PROCEDURE — 96374 THER/PROPH/DIAG INJ IV PUSH: CPT

## 2021-03-05 PROCEDURE — 85025 COMPLETE CBC W/AUTO DIFF WBC: CPT | Performed by: EMERGENCY MEDICINE

## 2021-03-05 PROCEDURE — G1004 CDSM NDSC: HCPCS

## 2021-03-05 PROCEDURE — 99285 EMERGENCY DEPT VISIT HI MDM: CPT | Performed by: EMERGENCY MEDICINE

## 2021-03-05 RX ORDER — LORAZEPAM 2 MG/ML
0.5 INJECTION INTRAMUSCULAR ONCE
Status: COMPLETED | OUTPATIENT
Start: 2021-03-05 | End: 2021-03-05

## 2021-03-05 RX ORDER — METHYLPREDNISOLONE 4 MG/1
TABLET ORAL
Qty: 21 TABLET | Refills: 0 | Status: SHIPPED | OUTPATIENT
Start: 2021-03-05 | End: 2021-05-14

## 2021-03-05 RX ADMIN — LORAZEPAM 0.5 MG: 2 INJECTION INTRAMUSCULAR; INTRAVENOUS at 10:02

## 2021-03-05 RX ADMIN — GADOBUTROL 5 ML: 604.72 INJECTION INTRAVENOUS at 10:36

## 2021-03-05 NOTE — DISCHARGE INSTRUCTIONS
Return immediately if worse or any new symptoms  Call your physician today for appointment in 3 days and discuss spinal surgical evaluation

## 2021-03-05 NOTE — Clinical Note
Shandrabay Bello was seen and treated in our emergency department on 3/5/2021  Diagnosis:     Hortensia Menon  may return to work on return date  She may return on this date: 03/09/2021         If you have any questions or concerns, please don't hesitate to call        Jesusita Marin, DO    ______________________________           _______________          _______________  Hospital Representative                              Date                                Time

## 2021-03-05 NOTE — ED NOTES
Patient MRI screening completed, patient stated, "Is Britt Jaime working today, because she is nice  I just had an MRI last week " I told her I do not know who is working today, I called and was told Miriam Dan is working today  Relayed this information to the patient  Patient reports claustrophobia        Brittany Ayala RN  03/05/21 5129

## 2021-03-05 NOTE — PROGRESS NOTES
Progress Note - Spine and Pain  Ellen Lot 46 y o  female MRN: [id number]  Unit/Bed#: ED 11 Encounter: [id number]      Assessment/Plan     Counseled patient at length that current symptomatology is likely exacerbation of worsening L4-L5 stenosis secondary to large broad-based disc herniation/extrusion at the L4-5 level  This is larger to the right of midline with posterior element hypertrophic change  Findings result in severe central canal stenosis and right foraminal narrowing with stable compression of the thecal sac and L4 nerve; no epidural hematoma noted on MRI; no fluid collection/mass noted that would suggest epidural hematoma/abscess; symptoms could also be attributed alternatively to steroids, discontinuation of gabapentin; her weakness resolved to baseline and she was able to walk to MRI  Counseled that should red flag symptoms develop including sudden weakness loss of bowel or bladder, to seek immediate medical attention  -will refer to orthopedics spine for evaluation of decompressive surgical options  -Patient to follow up as an outpatient 1-2 weeks postprocedure from yesterday's procedure or sooner should aforementioned symptoms develop  Service: Pain Medicine      Subjective:   Presenting to ED after right L3 and L4 TFESI;  patient reports   Walkingout to start  her car this morning at 6:40 a m ; she became dizzy, came back and sat on couch; called her fiance who then called 911 thinking she may be having a stroke; She felt that her lower extremities were shaking and then gradually worse unable to move them  When ems arrived, she was lifting legs repetitively up and down and when EMS asked her ot stop she did  Denies any pain, fever or chills  Denies bowel or bladder abnormality; describes numbness and weakness in bilateral lower extremeties that is improving since coming to ED       ROS:  General ROS: weakness, numbness, dizziness, paresthesias in bilateral lower extremeties    Vitals: Blood pressure (!) 148/104, pulse 75, temperature 98 4 °F (36 9 °C), temperature source Skin, resp  rate 20, weight 53 1 kg (117 lb 1 oz), SpO2 98 %  ,Body mass index is 23 64 kg/m²  Physical Exam: BP (!) 148/104 (BP Location: Left arm)   Pulse 75   Temp 98 4 °F (36 9 °C) (Skin)   Resp 20   Wt 53 1 kg (117 lb 1 oz)   SpO2 98%   BMI 23 64 kg/m²     General Appearance:    Alert, cooperative, no distress, appears stated age   Head:    Normocephalic, without obvious abnormality, atraumatic   Eyes:    PERRL, conjunctiva/corneas clear, EOM's intact, fundi     benign, both eyes   Ears:    Normal TM's and external ear canals, both ears   Nose:   Nares normal, septum midline, mucosa normal, no drainage    or sinus tenderness   Throat:   Lips, mucosa, and tongue normal; teeth and gums normal   Neck:   Supple, symmetrical, trachea midline, no adenopathy;     thyroid:  no enlargement/tenderness/nodules; no carotid    bruit or JVD   Back:     Symmetric, no curvature, ROM normal, needle entry sites clean, dry and intact                               Extremities:   Extremities normal, atraumatic, no cyanosis or edema   Pulses:   2+ and symmetric all extremities   Skin:   Skin color, texture, turgor normal, no rashes or lesions   Lymph nodes:   Cervical, supraclavicular, and axillary nodes normal   Neurologic:  Alert, oriented to person, place and time  CNII-XII intact, intermittent tremors; 5/5 strength in all active range of motion movements bilaterally in upper extremities  slightly increased tone in lower extremities, hesitant hip flexion and knee extension but  4/5 strength bilaterally with this maneuver; has intermittent tremors in right upper extremity with right hip flexion; unable to dorsiflex ankles bilaterally secondary to increased lower extremity tone  Normal saddle anesthesia; intact temp sensation at thighs, legs and feet bilaterally with exception of L4 dermatome   Intact sharp, dull sensation at thighs and legs bilaterally but sharp sensation diminished at feet bilaterally, most prominently in L4 dermatome bilaterally  DTRs at knees and ankles 2/4 and symmetric bilaterally           Lab, Imaging and other studies: I have personally reviewed pertinent reports  WBC elevated but likely from steroids  Counseling / Coordination of Care  Total time spent today 20 minutes  Greater than 50% of total time was spent with the patient and / or family counseling and / or coordination of care  A description of the counseling / coordination of care: arranging for followup, discussing potential causes of current presentation    MRI LUMBAR SPINE WITH AND WITHOUT CONTRAST     INDICATION: r/o spinal cord injury      COMPARISON:  2/23/2021     TECHNIQUE:  Sagittal T1, sagittal T2, sagittal inversion recovery, axial T1 and axial T2, coronal T2  Sagittal and axial T1 postcontrast      IV Contrast:  5 mL of Gadobutrol injection (SINGLE-DOSE)      IMAGE QUALITY:  Diagnostic     FINDINGS:     VERTEBRAL BODIES:  There are 5 lumbar type vertebral bodies  Grade 1 anterior spondylolisthesis of L4 upon L5 is again identified  No scoliosis  No compression fracture  Moderate type one endplate marrow degenerative change noted at the L4-5   endplates      SACRUM:  Normal signal within the sacrum  No evidence of insufficiency or stress fracture      DISTAL CORD AND CONUS:  Normal size and signal within the distal cord and conus      PARASPINAL SOFT TISSUES:  Paraspinal soft tissues are unremarkable      LOWER THORACIC DISC SPACES:  Normal disc height and signal   No disc herniation, canal stenosis or foraminal narrowing      LUMBAR DISC SPACES:     L1-L2:  Normal      L2-L3:  Normal      L3-L4:  Normal      L4-L5:  As described above there is stable anterior spondylolisthesis of L4 upon L5 on the basis of facet degenerative change    There is diffuse annular bulging with uncovering of the cephalad disc margin and a moderate broad-based disc extrusion   extending superiorly behind the L4 inferior endplate  This disc extrusion is more prominent within the right paramedian and subarticular portion of the disc  Facet hypertrophic degenerative change and ligamentum flavum thickening  Once again   identified is severe central canal stenosis, similar to the prior examination with severe right foraminal narrowing and compression of the exiting nerve      No evidence of epidural hematoma      L5-S1:  Normal      POSTCONTRAST IMAGING:  No abnormal enhancement      IMPRESSION:     Once again identified is a large broad-based disc herniation/extrusion at the L4-5 level  This is larger to the right of midline with posterior element hypertrophic change  Findings result in severe central canal stenosis and right foraminal narrowing   with stable compression of the thecal sac and L4 nerve      No evidence of epidural hematoma as clinically questioned      This examination was marked "immediate notification" in Epic in order to begin the standard process by which the radiology reading room liaison alerts the referring practitioner

## 2021-03-05 NOTE — ED PROVIDER NOTES
History  Chief Complaint   Patient presents with    Extremity Weakness     EMS reports patient went out ot start her car and came back in and sat on couch and got woozie, sat on the couch, called her fiance, who the ncalled 911 because he thought she was having a stroke  when EMS arrived ,she was lifting her legs repatatively up and down and EMS told her to stop and she did  Was taking gabapentin and told to stop it  66-year-old female complains of bilateral lower extremity weakness after getting ready for work like normally and then had to sit, felt lower extremity shaking and then generally unable to move  No pain  No fever chills  No injury  Notes she had a lumbar injection yesterday      History provided by:  Patient  Extremity Weakness  Location:  Bilateral lower extremities  Quality:  Generally weak, tremulous  Severity:  Severe  Onset quality:  Gradual  Timing:  Constant  Progression:  Unchanged  Chronicity:  New  Associated symptoms: no abdominal pain, no chest pain, no fever, no headaches, no nausea, no shortness of breath and no vomiting        Prior to Admission Medications   Prescriptions Last Dose Informant Patient Reported? Taking?    ALPRAZolam (XANAX) 0 5 mg tablet   No No   Sig: Take 1 tablet (0 5 mg total) by mouth once as needed (to be taken 30 minutes prior to MRI; can take additional tablet when arrives for mri) for up to 1 dose   atenolol (TENORMIN) 50 mg tablet   Yes No   Sig: Take 50 mg by mouth 2 (two) times a day   celecoxib (CeleBREX) 200 mg capsule   No No   Sig: Take 1 capsule (200 mg total) by mouth 2 (two) times a day   ergocalciferol (VITAMIN D2) 50,000 units   Yes No   gabapentin (NEURONTIN) 100 mg capsule   No No   Sig: Take 1 capsule (100 mg total) by mouth 3 (three) times a day      Facility-Administered Medications: None       Past Medical History:   Diagnosis Date    Hypertension        Past Surgical History:   Procedure Laterality Date    CHOLECYSTECTOMY      EPIDURAL BLOCK INJECTION Right 3/4/2021    Procedure: L3 and L4 TRANSFORAMINAL EPIDURAL STEROID INJECTION;  Surgeon: Homer Bowman MD;  Location: [facility];  Service: Pain Management     GALLBLADDER SURGERY  2017       Family History   Problem Relation Age of Onset    Coronary artery disease Mother     Cancer Father      I have reviewed and agree with the history as documented  E-Cigarette/Vaping    E-Cigarette Use Never User      E-Cigarette/Vaping Substances     Social History     Tobacco Use    Smoking status: Current Every Day Smoker     Packs/day: 0 50     Years: 30 00     Pack years: 15 00    Smokeless tobacco: Never Used   Substance Use Topics    Alcohol use: Not Currently     Frequency: Never    Drug use: Not Currently       Review of Systems   Constitutional: Negative for fever  Respiratory: Negative for shortness of breath  Cardiovascular: Negative for chest pain  Gastrointestinal: Negative for abdominal pain, nausea and vomiting  Musculoskeletal: Positive for extremity weakness  Neurological: Negative for headaches  All other systems reviewed and are negative  Physical Exam  Physical Exam  Vitals signs and nursing note reviewed  Constitutional:       Comments: Pleasant, comfortable-appearing   HENT:      Head: Normocephalic and atraumatic  Eyes:      Conjunctiva/sclera: Conjunctivae normal       Pupils: Pupils are equal, round, and reactive to light  Neck:      Musculoskeletal: Neck supple  Cardiovascular:      Rate and Rhythm: Normal rate and regular rhythm  Heart sounds: Normal heart sounds  Pulmonary:      Effort: Pulmonary effort is normal       Breath sounds: Normal breath sounds  Abdominal:      General: Bowel sounds are normal  There is no distension  Palpations: Abdomen is soft  Tenderness: There is no abdominal tenderness  Musculoskeletal: Normal range of motion        Comments: Initial gross general examination of bilateral lower extremities, appear to have tone, intermittent tremors, states she is unable to move at hips knees and ankles/feet bilaterally, but passively ranged in has tone and will allow lower extremities to remain elevated individually, voluntarily without assistance  Good saddle area sensation  Intact temperature sensation at thighs legs and feet bilaterally  Intact sharp/dull sensation at thighs legs bilaterally but sharp sensation diminished at feet bilaterally  Deep tendon reflexes at knees and ankles 2/4 are symmetric bilaterally   Skin:     General: Skin is warm and dry  Neurological:      Mental Status: She is alert and oriented to person, place, and time  Cranial Nerves: No cranial nerve deficit  Coordination: Coordination normal    Psychiatric:         Behavior: Behavior normal          Thought Content:  Thought content normal          Judgment: Judgment normal          Vital Signs  ED Triage Vitals   Temperature Pulse Respirations Blood Pressure SpO2   03/05/21 0723 03/05/21 0723 03/05/21 0723 03/05/21 0723 03/05/21 0723   98 4 °F (36 9 °C) 80 18 (!) 176/94 98 %      Temp Source Heart Rate Source Patient Position - Orthostatic VS BP Location FiO2 (%)   03/05/21 0723 03/05/21 0723 03/05/21 0723 03/05/21 0723 --   Skin Monitor Lying Left arm       Pain Score       03/05/21 0943       No Pain           Vitals:    03/05/21 0845 03/05/21 0915 03/05/21 0930 03/05/21 0945   BP: 143/88 (!) 211/138 149/96 (!) 148/104   Pulse: 63 81 65 75   Patient Position - Orthostatic VS: Lying Lying Lying Lying         Visual Acuity      ED Medications  Medications   LORazepam (ATIVAN) injection 0 5 mg (0 5 mg Intravenous Given 3/5/21 1002)   Gadobutrol injection (SINGLE-DOSE) SOLN 5 mL (5 mL Intravenous Given 3/5/21 1036)       Diagnostic Studies  Results Reviewed     Procedure Component Value Units Date/Time    Comprehensive metabolic panel [[id number]]  (Abnormal) Collected: 03/05/21 0756    Lab Status: Final result Specimen: Blood from Arm, Left Updated: 03/05/21 0821     Sodium 135 mmol/L      Potassium 4 1 mmol/L      Chloride 100 mmol/L      CO2 28 mmol/L      ANION GAP 7 mmol/L      BUN 20 mg/dL      Creatinine 0 81 mg/dL      Glucose 133 mg/dL      Calcium 10 3 mg/dL      AST 20 U/L      ALT 25 U/L      Alkaline Phosphatase 74 U/L      Total Protein 8 6 g/dL      Albumin 4 1 g/dL      Total Bilirubin 0 27 mg/dL      eGFR 84 ml/min/1 73sq m     Narrative:      [place] guidelines for Chronic Kidney Disease (CKD):     Stage 1 with normal or high GFR (GFR > 90 mL/min/1 73 square meters)    Stage 2 Mild CKD (GFR = 60-89 mL/min/1 73 square meters)    Stage 3A Moderate CKD (GFR = 45-59 mL/min/1 73 square meters)    Stage 3B Moderate CKD (GFR = 30-44 mL/min/1 73 square meters)    Stage 4 Severe CKD (GFR = 15-29 mL/min/1 73 square meters)    Stage 5 End Stage CKD (GFR <15 mL/min/1 73 square meters)  Note: GFR calculation is accurate only with a steady state creatinine    CBC and differential [[id number]]  (Abnormal) Collected: 03/05/21 0756    Lab Status: Final result Specimen: Blood from Arm, Left Updated: 03/05/21 0807     WBC 16 59 Thousand/uL      RBC 5 02 Million/uL      Hemoglobin 15 5 g/dL      Hematocrit 46 2 %      MCV 92 fL      MCH 30 9 pg      MCHC 33 5 g/dL      RDW 13 0 %      MPV 10 1 fL      Platelets 548 Thousands/uL      nRBC 0 /100 WBCs      Neutrophils Relative 87 %      Immat GRANS % 0 %      Lymphocytes Relative 9 %      Monocytes Relative 4 %      Eosinophils Relative 0 %      Basophils Relative 0 %      Neutrophils Absolute 14 36 Thousands/µL      Immature Grans Absolute 0 07 Thousand/uL      Lymphocytes Absolute 1 54 Thousands/µL      Monocytes Absolute 0 60 Thousand/µL      Eosinophils Absolute 0 00 Thousand/µL      Basophils Absolute 0 02 Thousands/µL                  MRI lumbar spine w wo contrast   Final Result by Varun Vasquez DO (03/05 5098)      Once again identified is a large broad-based disc herniation/extrusion at the L4-5 level  This is larger to the right of midline with posterior element hypertrophic change  Findings result in severe central canal stenosis and right foraminal narrowing    with stable compression of the thecal sac and L4 nerve  No evidence of epidural hematoma as clinically questioned  This examination was marked "immediate notification" in Epic in order to begin the standard process by which the radiology reading room liaison alerts the referring practitioner  Workstation performed: [id number]                    Procedures  Procedures         ED Course  ED Course as of Mar 05 1115   Fri Mar 05, 2021   0735 Pain Joseekar TT      0735  normal sinus rhythm rate 64 normal axis intervals no ST elevation or depression interpreted by me      0746 Raul requests MRI, discussed with Haydee Goldberg and ordered        0652 Bilateral lower extremity range of motion improving  She is able to slowly, flex lower extremities individually at hips knees and ankles with good extension and able to elevate without dropping  Requests Xanax for MRI      0838 WBC(!): 16 59   0913 Pain Raul evaluating, discussed with Radiology [facility], will consult Spine      1002 Patient demands immediate discharge secondary to son issue at home, ambulates well, but notes back / LE generally very painful   MRI tech bedside      1114 Notes she is much better, denies lower extremity weakness, ambulating, we discussed results and requests discharge                                              MDM    Disposition  Final diagnoses:   Weakness - Lower extremities status post epidural injection, resolved   Lumbar radiculopathy - Right     Time reflects when diagnosis was documented in both MDM as applicable and the Disposition within this note     Time User Action Codes Description Comment    3/5/2021 11:12 AM Adam Walsh Add [R53 1] Weakness     3/5/2021 11:12 AM Karie Chris Modify [R53 1] Weakness Lower extremities status post epidural injection, resolved    3/5/2021 11:13 AM Karie Chris Add [M07 18] Lumbar radiculopathy - Right       ED Disposition     ED Disposition Condition Date/Time Comment    Discharge Stable Fri Mar 5, 2021 11:12 AM Jolie Altamirano discharge to home/self care  Follow-up Information     Follow up With Specialties Details Why Contact Info    Lashay Moe DO Family Medicine Schedule an appointment as soon as possible for a visit in 3 days  [address] [facility]  [phone]            Patient's Medications   Discharge Prescriptions    METHYLPREDNISOLONE 4 MG TABLET THERAPY PACK    Use as directed on package       Start Date: 3/5/2021  End Date: --       Order Dose: --       Quantity: 21 tablet    Refills: 0     No discharge procedures on file      PDMP Review       Value Time User    PDMP Reviewed  Yes 2/17/2021  3:31 PM Reanna Valente MD          ED Provider  Electronically Signed by           Selena Jewell DO  03/05/21 7294

## 2021-03-07 LAB
ATRIAL RATE: 64 BPM
P AXIS: 53 DEGREES
PR INTERVAL: 148 MS
QRS AXIS: 83 DEGREES
QRSD INTERVAL: 96 MS
QT INTERVAL: 404 MS
QTC INTERVAL: 416 MS
T WAVE AXIS: 59 DEGREES
VENTRICULAR RATE: 64 BPM

## 2021-03-07 PROCEDURE — 93010 ELECTROCARDIOGRAM REPORT: CPT | Performed by: INTERNAL MEDICINE

## 2021-03-08 ENCOUNTER — TELEPHONE (OUTPATIENT)
Dept: PAIN MEDICINE | Facility: MEDICAL CENTER | Age: 51
End: 2021-03-08

## 2021-03-08 NOTE — TELEPHONE ENCOUNTER
Pt called stating her legs gave out on her an hour ago and she had numbness for 30 mins     Pt can be reached at [phone]

## 2021-03-09 ENCOUNTER — TELEPHONE (OUTPATIENT)
Dept: OTHER | Facility: HOSPITAL | Age: 51
End: 2021-03-09

## 2021-04-15 DIAGNOSIS — M54.16 LUMBAR RADICULOPATHY: ICD-10-CM

## 2021-04-15 RX ORDER — CELECOXIB 200 MG/1
CAPSULE ORAL
Qty: 60 CAPSULE | Refills: 0 | OUTPATIENT
Start: 2021-04-15

## 2021-05-14 ENCOUNTER — OFFICE VISIT (OUTPATIENT)
Dept: PAIN MEDICINE | Facility: CLINIC | Age: 51
End: 2021-05-14
Payer: COMMERCIAL

## 2021-05-14 VITALS
SYSTOLIC BLOOD PRESSURE: 154 MMHG | TEMPERATURE: 98.4 F | RESPIRATION RATE: 20 BRPM | DIASTOLIC BLOOD PRESSURE: 88 MMHG | HEART RATE: 58 BPM | BODY MASS INDEX: 23.59 KG/M2 | WEIGHT: 117 LBS | HEIGHT: 59 IN

## 2021-05-14 DIAGNOSIS — M48.062 SPINAL STENOSIS OF LUMBAR REGION WITH NEUROGENIC CLAUDICATION: Primary | ICD-10-CM

## 2021-05-14 PROCEDURE — 99214 OFFICE O/P EST MOD 30 MIN: CPT | Performed by: ANESTHESIOLOGY

## 2021-05-14 RX ORDER — NAPROXEN 500 MG/1
TABLET ORAL
COMMUNITY
Start: 2021-05-10 | End: 2021-06-11

## 2021-05-14 RX ORDER — FAMOTIDINE 20 MG/1
20 TABLET, FILM COATED ORAL 2 TIMES DAILY
COMMUNITY

## 2021-05-14 RX ORDER — PREDNISONE 20 MG/1
20 TABLET ORAL 2 TIMES DAILY
COMMUNITY
Start: 2021-05-09 | End: 2021-05-16

## 2021-05-14 NOTE — PATIENT INSTRUCTIONS
Lumbar Radiculopathy   WHAT YOU NEED TO KNOW:   Lumbar radiculopathy is a painful condition that happens when a nerve in your lumbar spine (lower back) is pinched or irritated  Nerves control feeling and movement in your body  You may have numbness or pain that shoots down from your lower back towards your foot  DISCHARGE INSTRUCTIONS:   Medicines:   · Medicines:     ? NSAIDs , such as ibuprofen, help decrease swelling, pain, and fever  This medicine is available with or without a doctor's order  NSAIDs can cause stomach bleeding or kidney problems in certain people  If you take blood thinner medicine, always ask your healthcare provider if NSAIDs are safe for you  Always read the medicine label and follow directions  ? Muscle relaxers  help decrease pain and muscle spasms  ? Opioids: This is a strong medicine given to reduce severe pain  It is also called narcotic pain medicine  Take this medicine exactly as directed by your healthcare provider  ? Oral steroids: Steroids may also be given to reduce pain and swelling  ? Take your medicine as directed  Contact your healthcare provider if you think your medicine is not helping or if you have side effects  Tell him of her if you are allergic to any medicine  Keep a list of the medicines, vitamins, and herbs you take  Include the amounts, and when and why you take them  Bring the list or the pill bottles to follow-up visits  Carry your medicine list with you in case of an emergency  Follow up with your healthcare provider or spine specialist within 1 to 3 weeks:  After your first follow-up appointment, return to your healthcare provider or spine specialist every 2 weeks until you have healed  Ask for information about physical therapy for your condition  Write down your questions so you remember to ask them during your visits  Physical therapy:  You may need physical therapy to improve your condition   Your physical therapist may teach you certain exercises to improve posture (the way you stand and sit), flexibility, and strength in your lower back  Self care:   · Stay active: It is best to be active when you have lumbar radiculopathy  Your physical therapist or healthcare provider may tell you to take walks to ease yourself back into your daily routine  Avoid long periods of bed rest  Bed rest could worsen your symptoms  Do not move in ways that increase your pain  Ask for more information about the best ways to stay active  · Use ice or heat packs:  Use ice or heat packs as directed on the sore area of your body to decrease the pain and swelling  Put ice in a plastic bag covered with a towel on your low back  Cover heated items with a towel to avoid burns  Use ice and heat as directed  · Avoid heavy lifting: Your condition may worsen if you lift heavy things  Avoid lifting if possible  · Maintain a healthy weight:  Excess body weight may strain your back  Talk with your healthcare provider about ways to lose excess weight if you are overweight  Contact your healthcare provider or spine specialist if:   · Your pain does not improve within 1 to 3 weeks after treatment  · Your pain and weakness keep you from your normal activities at work, home, or school  · You lose more than 10 pounds in 6 months without trying  · You become depressed or sad because of the pain  · You have questions or concerns about your condition or care  Return to the emergency department if:   · You have a fever greater than 100 4°F for longer than 2 days  · You have new, severe back or leg pain, or your pain spreads to both legs  · You have any new signs of numbness or weakness, especially in your lower back, legs, arms, or genital area  · You have new trouble controlling your urine and bowel movements  · You do not feel like your bladder empties when you urinate      © Copyright [employer] 2020 Information is for End User's use only and may not be sold, redistributed or otherwise used for commercial purposes  All illustrations and images included in CareNotes® are the copyrighted property of [employer]  or [employer]la [employer]  The above information is an  only  It is not intended as medical advice for individual conditions or treatments  Talk to your doctor, nurse or pharmacist before following any medical regimen to see if it is safe and effective for you  Core Strengthening Exercises   WHAT YOU NEED TO KNOW:   Your core includes the muscles of your lower back, hip, pelvis, and abdomen  Core strengthening exercises help heal and strengthen these muscles  This helps prevent another injury, and keeps your pelvis, spine, and hips in the correct position  DISCHARGE INSTRUCTIONS:   Contact your healthcare provider if:   · You have sharp or worsening pain during exercise or at rest     · You have questions or concerns about your shoulder exercises  Safety tips:  Talk to your healthcare provider before you start an exercise program  A physical therapist can teach you how to do core strengthening exercises safely  · Do the exercises on a mat or firm surface  A firm surface will support your spine and prevent low back pain  Do not do these exercises on a bed  · Move slowly and smoothly  Avoid fast or jerky motions  · Stop if you feel pain  Core exercises should not be painful  Stop if you feel pain  · Breathe normally during core exercises  Do not hold your breath  This may cause an increase in blood pressure and prevent muscle strengthening  Your healthcare provider will tell you when to inhale and exhale during the exercise  · Begin all of your exercises with abdominal bracing  Abdominal bracing helps warm up your core muscles  You can also practice abdominal bracing throughout the day  Lie on your back with your knees bent and feet flat on the floor  Place your arms in a relaxed position beside your body   Tighten your abdominal muscles  Pull your belly button in and up toward your spine  Hold for 5 seconds  Relax your muscles  Repeat 10 times  Core strengthening exercises: Your healthcare provider will tell you how often to do these exercises  The provider will also tell you how many repetitions of each exercise you should do  Hold each exercise for 5 seconds or as directed  As you get stronger, increase your hold to 10 to 15 seconds  You can do some of these exercises on a stability ball, or with a weight  Ask your healthcare provider how to use a stability ball or weight for these exercises:  · Bridging:  Lie on your back with your knees bent and feet flat on the floor  Rest your arms at your side  Tighten your buttocks, and then lift your hips 1 inch off the floor  Hold for 5 seconds  When you can do this exercise without pain for 10 seconds, increase the distance you lift your hips  A good goal is to be able to lift your hips so that your shoulders, hips, and knees are in a straight line  · Dead bug:  Lie on your back with your knees bent and feet flat on the floor  Place your arms in a relaxed position beside your body  Begin with abdominal bracing  Next, raise one leg, keeping your knee bent  Hold for 5 seconds  Repeat with the other leg  When you can do this exercise without pain for 10 to 15 seconds, you may raise one straight leg and hold  Repeat with the other leg  · Quadruped:  Place your hands and knees on the floor  Keep your wrists directly below your shoulders and your knees directly below your hips  Pull your belly button in toward your spine  Do not flatten or arch your back  Tighten your abdominal muscles below your belly button  Hold for 5 seconds  When you can do this exercise without pain for 10 to 15 seconds, you may extend one arm and hold  Repeat on the other side           · Side bridge exercises:      ? Standing side bridge:  Stand next to a wall and extend one arm toward the wall  Place your palm flat on the wall with your fingers pointing upward  Begin with abdominal bracing  Next, without moving your feet, slowly bend your arm to 90 degrees  Hold for 5 seconds  Repeat on the other side  When you can do this exercise without pain for 10 to 15 seconds, you may do the bent leg side bridge on the floor  ? Bent leg side bridge:  Lie on one side with your legs, hips, and shoulders in a straight line  Prop yourself up onto your forearm so your elbow is directly below your shoulder  Bend your knees back to 90 degrees  Begin with abdominal bracing  Next, lift your hips and balance yourself on your forearm and knees  Hold for 5 seconds  Repeat on the other side  When you can do this exercise without pain for 10 to 15 seconds, you may do the straight leg side bridge on the floor  ? Straight leg side bridge:  Lie on one side with your legs, hips, and shoulders in a straight line  Prop yourself up onto your forearm so your elbow is directly below your shoulder  Begin with abdominal bracing  Lift your hips off the floor and balance yourself on your forearm and the outside of your flexed foot  Do not let your ankle bend sideways  Hold for 5 seconds  Repeat on the other side  When you can do this exercise without pain for 10 to 15 seconds, ask your healthcare provider for more advanced exercises  · Superman:  Lie on your stomach  Extend your arms forward on the floor  Tighten your abdominal muscles and lift your right hand and left leg off the floor  Hold this position  Slowly return to the starting position  Tighten your abdominal muscles and lift your left hand and right leg off the floor  Hold this position  Slowly return to the starting position  · Clam:  Lie on your side with your knees bent  Put your bottom arm under your head to keep your neck in line  Put your top hand on your hip to keep your pelvis from moving   Put your heels together, and keep them together during this exercise  Slowly raise your top knee toward the ceiling  Then lower your leg so your knees are together  Repeat this exercise 10 times  Then switch sides and do the exercise 10 times with the other leg  · Curl up:  Lie on your back with your knees bent and feet flat on the floor  Place your hands, palms down, underneath your lower back  Next, with your elbows on the floor, lift your shoulders and chest 2 to 3 inches off the floor  Keep your head in line with your shoulders  Hold this position  Slowly return to the starting position  · Straight leg raises:  Lie on your back with one leg straight  Bend the other knee and place your foot flat on the floor  Tighten your abdominal muscles  Keep your leg straight and slowly lift it straight up 6 to 12 inches off the floor  Hold this position  Lower your leg slowly  Do as many repetitions as directed on this side  Repeat with the other leg  · Plank:  Lie on your stomach  Bend your elbows and place your forearms flat on the floor  Lift your chest, stomach, and knees off the floor  Make sure your elbows are below your shoulders  Your body should be in a straight line  Do not let your hips or lower back sink to the ground  Squeeze your abdominal muscles together and hold for 15 seconds  To make this exercise harder, hold for 30 seconds or lift 1 leg at a time  · Bicycles:  Lie on your back  Bend both knees and bring them toward your chest  Your calves should be parallel to the floor  Place the palms of your hands on the back of your head  Straighten your right leg and keep it lifted 2 inches off the floor  Raise your head and shoulders off the floor and twist towards your left  Keep your head and shoulders lifted  Bend your right knee while you straighten your left leg  Keep your left leg 2 inches off the floor  Twist your head and chest towards the left leg   Continue to straighten 1 leg at a time and twist        Follow up with your healthcare provider as directed:  Write down your questions so you remember to ask them during your visits  © Copyright [address] Information is for End User's use only and may not be sold, redistributed or otherwise used for commercial purposes  All illustrations and images included in CareNotes® are the copyrighted property of [employer] , [employer]  or [employer]  The above information is an  only  It is not intended as medical advice for individual conditions or treatments  Talk to your doctor, nurse or pharmacist before following any medical regimen to see if it is safe and effective for you

## 2021-05-14 NOTE — PROGRESS NOTES
Assessment  1  Spinal stenosis of lumbar region with neurogenic claudication  -     Ambulatory referral to Orthopedic Surgery; Future    Pain numbness and weakness in right L3 and L4 dermatomal distribution accompanied by debilitating symptomatology of radiculopathy  L4-5 moderate  [facility]; recent MRI from 2021 shows large broad-based disc herniation/extrusion at the L4-5 level  This is larger to the right of midline with posterior element hypertrophic change  Findings result in severe central canal stenosis and right foraminal narrowing with stable compression of the thecal sac and L4 nerve  Bilateral  moderate degree of transformational stenosis seen at this level  She had reasonable benefit for a period of 2 months after right L3 and L4 TFESI; however she was recently seen for an exacerbation in the ED on 5/9/21 and was discharged with NSAIDs and a steroid taper  The patient called reporting that her legs have been giving out more recently  She had good strength, but at times her legs will buckle and she will feel weak  I counseled the patient secondary to her severe central canal stenosis at L4-L5, it is recommended that she seek surgical consultation for decompression  I counseled her not to operate heavy machinery or drive given her current condition  We will ensure that she has appointment for surgical consultation with Dr Ambrose Garrido today( patient previously cancelled)  Counseled patient once again regarding red flag signs including bowel/ bladder abnormality, sudden weakness, saddle anesthesia and to report to ED or contact me should these sxs develop      Plan   -orthopedics-spine referral to Dr Ambrose Garrido for surgical treatment options for severe central canal stenosis w/neurogenic claudication at L4-L5  -gabapentin and celebrex discontinued by patient  -may continue advil  Patient educated regarding bleeding risk of taking this medication not taking any other nonsteroidal anti-inflammatory medications while taking this medication  -physical therapy for right-sided lumbar radiculopathy    There are risks associated with opioid medications, including dependence, addiction and tolerance  The patient understands and agrees to use these medications only as prescribed  Potential side effects of the medications include, but are not limited to, constipation, drowsiness, addiction, impaired judgment and risk of fatal overdose if not taken as prescribed  The patient was warned against driving while taking sedation medications  Sharing medications is a felony  At this point in time, the patient is showing no signs of addiction, abuse, diversion or suicidal ideation  [place] Prescription Drug Monitoring Program report was reviewed and was appropriate     Complete risks and benefits including bleeding, infection, tissue reaction, nerve injury and allergic reaction were discussed  The approach was demonstrated using models and literature was provided  Verbal and written consent was obtained  My impressions and treatment recommendations were discussed in detail with the patient who verbalized understanding and had no further questions  Discharge instructions were provided  I personally saw and examined the patient and I agree with the above discussed plan of care  New Medications Ordered This Visit   Medications    Ergocalciferol (VITAMIN D2 PO)     Sig: Take 50,000 Units by mouth    famotidine (PEPCID) 20 mg tablet     Sig: Take 20 mg by mouth 2 (two) times a day    naproxen (NAPROSYN) 500 mg tablet    predniSONE 20 mg tablet     Sig: Take 20 mg by mouth 2 (two) times a day       History of Present Illness     Dilcia Latham is a 46 y o  female  With past medical history of  COPD, hypertension (1/2 pack per day smoker) presenting with  progressive right-sided lumbar radiculopathy for the past 3 years    She presents with pain in the right L3 and L4 dermatomal distributions  Debilitating weakness numbness and paresthesias accompany the pain  The patient rates the pain at a 8/10 accompanied by electric shock-like shooting features and crampy burning pain in the aforementioned dermatomal distributions  The pain is worse in the mornings as well as the end of the day; exertion such as walking for long periods of time seems to exacerbate the pain  The patient can hardly walk more than a few blocks without having debilitating pain  She tries to maintain an active lifestyle and finds that the current degree of pain seems to compromises her efforts  The pain significantly impacts independent activities of daily living and contributes to significant disability  She has attempted a course of physical therapy 2 years ago with exacerbation of the pain  She has taken naproxen with limited relief of the pain as well  She has had multiple epidural steroid injections in the past with approximately 4 months benefit at a time  Most recently had right L3 and L4 TFESI with good relief for about 2 months; however she was recently seen for an exacerbation in the ED on 5/9/21 and was discharged with NSAIDs and a steroid taper  She denies any bowel or bladder dysfunction/incontinence    I have personally reviewed and/or updated the patient's past medical history, past surgical history, family history, social history, current medications, allergies, and vital signs today  Review of Systems   Constitutional: Positive for activity change  HENT: Negative  Eyes: Negative  Respiratory: Negative  Cardiovascular: Negative  Gastrointestinal: Negative  Endocrine: Negative  Genitourinary: Negative  Musculoskeletal: Positive for arthralgias, back pain, gait problem and myalgias  Skin: Negative  Allergic/Immunologic: Negative  Neurological: Positive for weakness and numbness  Hematological: Negative      Psychiatric/Behavioral: Negative  All other systems reviewed and are negative        Patient Active Problem List   Diagnosis    Lumbar radiculopathy - Right       Past Medical History:   Diagnosis Date    Hypertension        Past Surgical History:   Procedure Laterality Date    CHOLECYSTECTOMY      EPIDURAL BLOCK INJECTION Right 3/4/2021    Procedure: L3 and L4 TRANSFORAMINAL EPIDURAL STEROID INJECTION;  Surgeon: Betsey Murphy MD;  Location: [facility];  Service: Pain Management     GALLBLADDER SURGERY  2017       Family History   Problem Relation Age of Onset    Coronary artery disease Mother     Cancer Father        Social History     Occupational History    Not on file   Tobacco Use    Smoking status: Current Every Day Smoker     Packs/day: 0 50     Years: 30 00     Pack years: 15 00    Smokeless tobacco: Never Used   Substance and Sexual Activity    Alcohol use: Not Currently     Frequency: Never    Drug use: Not Currently    Sexual activity: Not on file       Current Outpatient Medications on File Prior to Visit   Medication Sig    atenolol (TENORMIN) 50 mg tablet Take 50 mg by mouth 2 (two) times a day    Ergocalciferol (VITAMIN D2 PO) Take 50,000 Units by mouth    famotidine (PEPCID) 20 mg tablet Take 20 mg by mouth 2 (two) times a day    naproxen (NAPROSYN) 500 mg tablet     predniSONE 20 mg tablet Take 20 mg by mouth 2 (two) times a day    [DISCONTINUED] celecoxib (CeleBREX) 200 mg capsule Take 1 capsule (200 mg total) by mouth 2 (two) times a day    [DISCONTINUED] ALPRAZolam (XANAX) 0 5 mg tablet Take 1 tablet (0 5 mg total) by mouth once as needed (to be taken 30 minutes prior to MRI; can take additional tablet when arrives for mri) for up to 1 dose    [DISCONTINUED] ergocalciferol (VITAMIN D2) 50,000 units     [DISCONTINUED] gabapentin (NEURONTIN) 100 mg capsule Take 1 capsule (100 mg total) by mouth 3 (three) times a day    [DISCONTINUED] methylPREDNISolone 4 MG tablet therapy pack Use as directed on package     No current facility-administered medications on file prior to visit  Allergies   Allergen Reactions    Percocet [Oxycodone-Acetaminophen] GI Intolerance         Physical Exam    /88   Pulse 58   Temp 98 4 °F (36 9 °C)   Resp 20   Ht 4' 11" (1 499 m)   Wt 53 1 kg (117 lb)   BMI 23 63 kg/m²     Constitutional: normal, well developed, well nourished, alert, in no distress and non-toxic and no overt pain behavior  Eyes: anicteric  HEENT: grossly intact  Neck: supple, symmetric, trachea midline and no masses   Pulmonary:even and unlabored  Cardiovascular:No edema or pitting edema present  Skin:Normal without rashes or lesions and well hydrated  Psychiatric:Mood and affect appropriate  Neurologic:Cranial Nerves II-XII grossly intact Sensation grossly intact; no clonus negative campos's  Reflexes 2+ and brisk; right quadriceps reflex slightly diminished  SLR weakly positive right-sided  Musculoskeletal: antalgic gait  Normal heel toe and tip toe walking  Pain limited right hip flexion/  Right knee extension  5/5 strength otherwise in all other extremities bilaterally with active range of motion movements  Significant pain with lumbar facet loading bilaterally and with lateral spine rotation right greater than left  ttp over lumbar paraspinal muscles  Negative ivory's test, negative gaenslen's negative SIJ loading bilaterally  Imaging    MRI LUMBAR SPINE WITH AND WITHOUT CONTRAST     INDICATION: r/o spinal cord injury      COMPARISON:  2/23/2021     TECHNIQUE:  Sagittal T1, sagittal T2, sagittal inversion recovery, axial T1 and axial T2, coronal T2  Sagittal and axial T1 postcontrast      IV Contrast:  5 mL of Gadobutrol injection (SINGLE-DOSE)      IMAGE QUALITY:  Diagnostic     FINDINGS:     VERTEBRAL BODIES:  There are 5 lumbar type vertebral bodies  Grade 1 anterior spondylolisthesis of L4 upon L5 is again identified  No scoliosis  No compression fracture    Moderate type one endplate marrow degenerative change noted at the L4-5   endplates      SACRUM:  Normal signal within the sacrum  No evidence of insufficiency or stress fracture      DISTAL CORD AND CONUS:  Normal size and signal within the distal cord and conus      PARASPINAL SOFT TISSUES:  Paraspinal soft tissues are unremarkable      LOWER THORACIC DISC SPACES:  Normal disc height and signal   No disc herniation, canal stenosis or foraminal narrowing      LUMBAR DISC SPACES:     L1-L2:  Normal      L2-L3:  Normal      L3-L4:  Normal      L4-L5:  As described above there is stable anterior spondylolisthesis of L4 upon L5 on the basis of facet degenerative change  There is diffuse annular bulging with uncovering of the cephalad disc margin and a moderate broad-based disc extrusion   extending superiorly behind the L4 inferior endplate  This disc extrusion is more prominent within the right paramedian and subarticular portion of the disc  Facet hypertrophic degenerative change and ligamentum flavum thickening  Once again   identified is severe central canal stenosis, similar to the prior examination with severe right foraminal narrowing and compression of the exiting nerve      No evidence of epidural hematoma      L5-S1:  Normal      POSTCONTRAST IMAGING:  No abnormal enhancement      IMPRESSION:     Once again identified is a large broad-based disc herniation/extrusion at the L4-5 level  This is larger to the right of midline with posterior element hypertrophic change  Findings result in severe central canal stenosis and right foraminal narrowing   with stable compression of the thecal sac and L4 nerve      No evidence of epidural hematoma as clinically questioned      This examination was marked "immediate notification" in Epic in order to begin the standard process by which the radiology reading room liaison alerts the referring practitioner

## 2021-05-24 ENCOUNTER — OFFICE VISIT (OUTPATIENT)
Dept: OBGYN CLINIC | Facility: HOSPITAL | Age: 51
End: 2021-05-24
Payer: COMMERCIAL

## 2021-05-24 VITALS
BODY MASS INDEX: 23.59 KG/M2 | SYSTOLIC BLOOD PRESSURE: 129 MMHG | HEART RATE: 69 BPM | DIASTOLIC BLOOD PRESSURE: 86 MMHG | WEIGHT: 117 LBS | HEIGHT: 59 IN

## 2021-05-24 DIAGNOSIS — M43.16 SPONDYLOLISTHESIS OF LUMBAR REGION: ICD-10-CM

## 2021-05-24 DIAGNOSIS — M54.41 CHRONIC RIGHT-SIDED LOW BACK PAIN WITH RIGHT-SIDED SCIATICA: ICD-10-CM

## 2021-05-24 DIAGNOSIS — M54.16 LUMBAR RADICULOPATHY: Primary | ICD-10-CM

## 2021-05-24 DIAGNOSIS — G89.29 CHRONIC RIGHT-SIDED LOW BACK PAIN WITH RIGHT-SIDED SCIATICA: ICD-10-CM

## 2021-05-24 PROCEDURE — 99204 OFFICE O/P NEW MOD 45 MIN: CPT | Performed by: ORTHOPAEDIC SURGERY

## 2021-05-24 RX ORDER — CHLORHEXIDINE GLUCONATE 0.12 MG/ML
15 RINSE ORAL ONCE
Status: CANCELLED | OUTPATIENT
Start: 2021-05-24 | End: 2021-05-24

## 2021-05-24 RX ORDER — ACETAMINOPHEN 325 MG/1
975 TABLET ORAL ONCE
Status: CANCELLED | OUTPATIENT
Start: 2021-05-24 | End: 2021-05-24

## 2021-05-24 RX ORDER — GABAPENTIN 300 MG/1
300 CAPSULE ORAL ONCE
Status: CANCELLED | OUTPATIENT
Start: 2021-05-24 | End: 2021-05-24

## 2021-05-24 RX ORDER — CEFAZOLIN SODIUM 2 G/50ML
2000 SOLUTION INTRAVENOUS ONCE
Status: CANCELLED | OUTPATIENT
Start: 2021-05-24 | End: 2021-05-24

## 2021-05-24 NOTE — PROGRESS NOTES
Assessment/Plan:       patient with moderate to severe spinal stenosis at L4-5 in the setting of spondylolisthesis  She has tried and failed conservative management including physical therapy oral medications and epidural steroid injections  She is more interested now in surgical intervention  We did discuss lumbar laminectomy decompression instrumented fusion with interbody at L4-5  She understands risks to include but are not limited to bleeding, infection, CSF leak, hardware complications, delayed union, possible need for reoperation and possible persistent symptoms  She will like to proceed  She will need medical clearance and blood work  No problem-specific Assessment & Plan notes found for this encounter  Chronic low back pain with right leg pain  · S/p 1x right L3 and L4 TFESI with Dr Carter  · Lumbar fusion is discussed as reasonable treatment option   · 2nd opinion is encouraged   · Patent should stop smoking tobacco immediately   · Patient is a candidate for lumbar laminectomy decompression with instrumented fusion L4-L5 and possible additional levels  she understands risks of surgery to include but not limited to bleeding, infection, CSF leak, nerve damage, possible persistent symptoms, possible need for reoperation  she will need medical clearance and blood work  · Follow up after surgery        Problem List Items Addressed This Visit        Nervous and Auditory    Lumbar radiculopathy - Right - Primary      Other Visit Diagnoses     Chronic right-sided low back pain with right-sided sciatica                Subjective:      Patient ID: Helen Demarco is a 46 y o  female  HPI   The patient presents for initial evaluation of lumbar spine  She has had symptoms for about 3 years increasing over time  Today she complains of right low back pain with right anterior thigh pain, anterior shin tingling and bilateral calf cramping  She rates her pain at 6/10 and 9/10 at its worse  Prolonged standing and walking aggravates while rest alleviates  She does use Advil with some benefit and has used steroids in past with benefit yet side-effects of shaking  She has tried physical therapy in past with limited benefit  She has had 1x L3-L4 right TFESI  She denies past lumbar surgery  She does smoke cigarettes  The following portions of the patient's history were reviewed and updated as appropriate: allergies, current medications, past family history, past medical history, past social history, past surgical history and problem list     Review of Systems   Constitutional: Negative for chills, fever and unexpected weight change  HENT: Negative for hearing loss, nosebleeds and sore throat  Eyes: Negative for pain, redness and visual disturbance  Respiratory: Negative for cough, shortness of breath and wheezing  Cardiovascular: Negative for chest pain, palpitations and leg swelling  Gastrointestinal: Negative for abdominal pain, nausea and vomiting  Genitourinary: Negative for dyspareunia, dysuria and frequency  Skin: Negative for rash and wound  Neurological: Negative for dizziness, numbness and headaches  Psychiatric/Behavioral: Negative for decreased concentration and suicidal ideas  The patient is not nervous/anxious  Objective:      /86   Pulse 69   Ht 4' 11" (1 499 m)   Wt 53 1 kg (117 lb)   BMI 23 63 kg/m²          Physical Exam  Constitutional:       Appearance: She is well-developed  HENT:      Head: Normocephalic  Eyes:      Conjunctiva/sclera: Conjunctivae normal    Neck:      Musculoskeletal: Normal range of motion  Cardiovascular:      Rate and Rhythm: Normal rate  Pulmonary:      Effort: Pulmonary effort is normal    Skin:     General: Skin is warm and dry  Neurological:      Mental Status: She is alert and oriented to person, place, and time           Patient ambulates without assistance   Tender to palpation: none   Modified straight leg raise negative bilaterally   Strength L2-S1 5/5 bilaterally   Sensation L2-S1 intact bilaterally       Imaging:  Lumbar MRI of 3/5/2021  Radiologist's Once again identified is a large broad-based disc herniation/extrusion at the L4-5 level  This is larger to the right of midline with posterior element hypertrophic change  Findings result in severe central canal stenosis and right foraminal narrowing   with stable compression of the thecal sac and L4 nerve      No evidence of epidural hematoma as clinically questioned      Scribe Attestation    I,:  Robert Moe am acting as a scribe while in the presence of the attending physician :       I,:  Lorri Garcia MD personally performed the services described in this documentation    as scribed in my presence :

## 2021-06-11 ENCOUNTER — TELEPHONE (OUTPATIENT)
Dept: PAIN MEDICINE | Facility: CLINIC | Age: 51
End: 2021-06-11

## 2021-06-11 DIAGNOSIS — M54.16 LUMBAR RADICULOPATHY: Primary | ICD-10-CM

## 2021-06-11 RX ORDER — CELECOXIB 200 MG/1
200 CAPSULE ORAL 2 TIMES DAILY
Qty: 60 CAPSULE | Refills: 0 | Status: SHIPPED | OUTPATIENT
Start: 2021-06-11 | End: 2021-07-04

## 2021-06-11 RX ORDER — GABAPENTIN 100 MG/1
100 CAPSULE ORAL
Qty: 30 CAPSULE | Refills: 0 | Status: SHIPPED | OUTPATIENT
Start: 2021-06-11 | End: 2021-06-14

## 2021-06-11 NOTE — TELEPHONE ENCOUNTER
Patient   [phone]  Dr Nishant Griffin    Patient is calling in requesting another script for Gabapentin and Celecoxib  She is requesting this medication again due to her pain  Her pain level is a 9-10/10 she is having her sx  She has an appt on 6/23/21 with the surgeon   Please call pt when script is sent to pharmacy     [facility]/PHARMACY Emily Ashraf

## 2021-06-11 NOTE — TELEPHONE ENCOUNTER
S/w pt, pt states she has not taken Gabapentin or Celecoxib since she had the Epidural inj however the inj has now worn off and pt has an appt with surgeon on 6/23  Per pt she needs something to help her pain  Pt was taking gabapentin 100 mg HS and Celecoxib 200 mg bid and is requesting refills

## 2021-06-14 RX ORDER — GABAPENTIN 300 MG/1
300 CAPSULE ORAL 3 TIMES DAILY
Qty: 90 CAPSULE | Refills: 0 | Status: SHIPPED | OUTPATIENT
Start: 2021-06-14

## 2021-06-14 NOTE — TELEPHONE ENCOUNTER
The celebex is sort of working and the gabapentin is not doing anything  Pt is asking if she should take more gabapentin?  [phone]

## 2021-07-03 DIAGNOSIS — M54.16 LUMBAR RADICULOPATHY: ICD-10-CM

## 2021-07-04 RX ORDER — CELECOXIB 200 MG/1
CAPSULE ORAL
Qty: 60 CAPSULE | Refills: 0 | Status: SHIPPED | OUTPATIENT
Start: 2021-07-04

## 2021-10-13 ENCOUNTER — HOSPITAL ENCOUNTER (OUTPATIENT)
Dept: RADIOLOGY | Facility: HOSPITAL | Age: 51
Discharge: HOME | End: 2021-10-13
Payer: COMMERCIAL

## 2021-10-13 DIAGNOSIS — Z98.1 S/P LUMBAR FUSION: ICD-10-CM

## 2021-10-13 PROCEDURE — 72100 X-RAY EXAM L-S SPINE 2/3 VWS: CPT

## 2021-12-11 ENCOUNTER — HOSPITAL ENCOUNTER (OUTPATIENT)
Dept: RADIOLOGY | Facility: HOSPITAL | Age: 51
Discharge: HOME | End: 2021-12-11
Payer: COMMERCIAL

## 2021-12-11 DIAGNOSIS — Z98.1 S/P LUMBAR FUSION: ICD-10-CM

## 2021-12-11 PROCEDURE — 72100 X-RAY EXAM L-S SPINE 2/3 VWS: CPT

## 2022-07-26 ENCOUNTER — HOSPITAL ENCOUNTER (EMERGENCY)
Facility: HOSPITAL | Age: 52
Discharge: HOME | End: 2022-07-26
Attending: STUDENT IN AN ORGANIZED HEALTH CARE EDUCATION/TRAINING PROGRAM | Admitting: STUDENT IN AN ORGANIZED HEALTH CARE EDUCATION/TRAINING PROGRAM
Payer: COMMERCIAL

## 2022-07-26 ENCOUNTER — APPOINTMENT (EMERGENCY)
Dept: CT IMAGING | Facility: HOSPITAL | Age: 52
End: 2022-07-26
Payer: COMMERCIAL

## 2022-07-26 VITALS
OXYGEN SATURATION: 97 % | RESPIRATION RATE: 20 BRPM | SYSTOLIC BLOOD PRESSURE: 153 MMHG | HEART RATE: 61 BPM | WEIGHT: 130.73 LBS | BODY MASS INDEX: 26.4 KG/M2 | DIASTOLIC BLOOD PRESSURE: 90 MMHG | TEMPERATURE: 98.3 F

## 2022-07-26 DIAGNOSIS — R56.9 SEIZURE-LIKE ACTIVITY (HCC): Primary | ICD-10-CM

## 2022-07-26 LAB
ANION GAP SERPL CALCULATED.3IONS-SCNC: 8 MMOL/L (ref 4–13)
BACTERIA UR QL AUTO: NORMAL /HPF
BASOPHILS # BLD AUTO: 0.06 THOUSANDS/ΜL (ref 0–0.1)
BASOPHILS NFR BLD AUTO: 1 % (ref 0–1)
BILIRUB UR QL STRIP: NEGATIVE
BUN SERPL-MCNC: 20 MG/DL (ref 5–25)
CALCIUM SERPL-MCNC: 8.6 MG/DL (ref 8.3–10.1)
CHLORIDE SERPL-SCNC: 106 MMOL/L (ref 96–108)
CK MB SERPL-MCNC: 1 NG/ML (ref 0–5)
CK MB SERPL-MCNC: <1 % (ref 0–2.5)
CK SERPL-CCNC: 139 U/L (ref 26–192)
CLARITY UR: CLEAR
CO2 SERPL-SCNC: 25 MMOL/L (ref 21–32)
COLOR UR: YELLOW
CREAT SERPL-MCNC: 0.85 MG/DL (ref 0.6–1.3)
EOSINOPHIL # BLD AUTO: 0.34 THOUSAND/ΜL (ref 0–0.61)
EOSINOPHIL NFR BLD AUTO: 3 % (ref 0–6)
ERYTHROCYTE [DISTWIDTH] IN BLOOD BY AUTOMATED COUNT: 14.1 % (ref 11.6–15.1)
GFR SERPL CREATININE-BSD FRML MDRD: 79 ML/MIN/1.73SQ M
GLUCOSE SERPL-MCNC: 100 MG/DL (ref 65–140)
GLUCOSE SERPL-MCNC: 83 MG/DL (ref 65–140)
GLUCOSE UR STRIP-MCNC: NEGATIVE MG/DL
HCT VFR BLD AUTO: 41.6 % (ref 34.8–46.1)
HGB BLD-MCNC: 13.6 G/DL (ref 11.5–15.4)
HGB UR QL STRIP.AUTO: ABNORMAL
IMM GRANULOCYTES # BLD AUTO: 0.03 THOUSAND/UL (ref 0–0.2)
IMM GRANULOCYTES NFR BLD AUTO: 0 % (ref 0–2)
KETONES UR STRIP-MCNC: NEGATIVE MG/DL
LACTATE SERPL-SCNC: 1.2 MMOL/L (ref 0.5–2)
LEUKOCYTE ESTERASE UR QL STRIP: NEGATIVE
LYMPHOCYTES # BLD AUTO: 2.5 THOUSANDS/ΜL (ref 0.6–4.47)
LYMPHOCYTES NFR BLD AUTO: 23 % (ref 14–44)
MAGNESIUM SERPL-MCNC: 1.9 MG/DL (ref 1.6–2.6)
MCH RBC QN AUTO: 30 PG (ref 26.8–34.3)
MCHC RBC AUTO-ENTMCNC: 32.7 G/DL (ref 31.4–37.4)
MCV RBC AUTO: 92 FL (ref 82–98)
MONOCYTES # BLD AUTO: 0.62 THOUSAND/ΜL (ref 0.17–1.22)
MONOCYTES NFR BLD AUTO: 6 % (ref 4–12)
NEUTROPHILS # BLD AUTO: 7.15 THOUSANDS/ΜL (ref 1.85–7.62)
NEUTS SEG NFR BLD AUTO: 67 % (ref 43–75)
NITRITE UR QL STRIP: NEGATIVE
NON-SQ EPI CELLS URNS QL MICRO: NORMAL /HPF
NRBC BLD AUTO-RTO: 0 /100 WBCS
PH UR STRIP.AUTO: 5.5 [PH]
PLATELET # BLD AUTO: 258 THOUSANDS/UL (ref 149–390)
PMV BLD AUTO: 10 FL (ref 8.9–12.7)
POTASSIUM SERPL-SCNC: 4.2 MMOL/L (ref 3.5–5.3)
PROT UR STRIP-MCNC: NEGATIVE MG/DL
RBC # BLD AUTO: 4.54 MILLION/UL (ref 3.81–5.12)
RBC #/AREA URNS AUTO: NORMAL /HPF
SODIUM SERPL-SCNC: 139 MMOL/L (ref 135–147)
SP GR UR STRIP.AUTO: 1.01 (ref 1–1.03)
UROBILINOGEN UR QL STRIP.AUTO: 0.2 E.U./DL
WBC # BLD AUTO: 10.7 THOUSAND/UL (ref 4.31–10.16)
WBC #/AREA URNS AUTO: NORMAL /HPF

## 2022-07-26 PROCEDURE — 80048 BASIC METABOLIC PNL TOTAL CA: CPT | Performed by: STUDENT IN AN ORGANIZED HEALTH CARE EDUCATION/TRAINING PROGRAM

## 2022-07-26 PROCEDURE — 85025 COMPLETE CBC W/AUTO DIFF WBC: CPT | Performed by: STUDENT IN AN ORGANIZED HEALTH CARE EDUCATION/TRAINING PROGRAM

## 2022-07-26 PROCEDURE — 83735 ASSAY OF MAGNESIUM: CPT | Performed by: STUDENT IN AN ORGANIZED HEALTH CARE EDUCATION/TRAINING PROGRAM

## 2022-07-26 PROCEDURE — 99285 EMERGENCY DEPT VISIT HI MDM: CPT | Performed by: STUDENT IN AN ORGANIZED HEALTH CARE EDUCATION/TRAINING PROGRAM

## 2022-07-26 PROCEDURE — 99284 EMERGENCY DEPT VISIT MOD MDM: CPT

## 2022-07-26 PROCEDURE — 83605 ASSAY OF LACTIC ACID: CPT | Performed by: STUDENT IN AN ORGANIZED HEALTH CARE EDUCATION/TRAINING PROGRAM

## 2022-07-26 PROCEDURE — 82553 CREATINE MB FRACTION: CPT | Performed by: STUDENT IN AN ORGANIZED HEALTH CARE EDUCATION/TRAINING PROGRAM

## 2022-07-26 PROCEDURE — 81001 URINALYSIS AUTO W/SCOPE: CPT | Performed by: STUDENT IN AN ORGANIZED HEALTH CARE EDUCATION/TRAINING PROGRAM

## 2022-07-26 PROCEDURE — 36415 COLL VENOUS BLD VENIPUNCTURE: CPT | Performed by: STUDENT IN AN ORGANIZED HEALTH CARE EDUCATION/TRAINING PROGRAM

## 2022-07-26 PROCEDURE — G1004 CDSM NDSC: HCPCS

## 2022-07-26 PROCEDURE — 93005 ELECTROCARDIOGRAM TRACING: CPT

## 2022-07-26 PROCEDURE — 70450 CT HEAD/BRAIN W/O DYE: CPT

## 2022-07-26 PROCEDURE — 82948 REAGENT STRIP/BLOOD GLUCOSE: CPT

## 2022-07-26 PROCEDURE — 82550 ASSAY OF CK (CPK): CPT | Performed by: STUDENT IN AN ORGANIZED HEALTH CARE EDUCATION/TRAINING PROGRAM

## 2022-07-26 RX ORDER — GABAPENTIN 300 MG/1
300 CAPSULE ORAL ONCE
Status: COMPLETED | OUTPATIENT
Start: 2022-07-26 | End: 2022-07-26

## 2022-07-26 RX ORDER — GABAPENTIN 300 MG/1
300 CAPSULE ORAL 3 TIMES DAILY
Qty: 90 CAPSULE | Refills: 0 | Status: SHIPPED | OUTPATIENT
Start: 2022-07-26

## 2022-07-26 RX ADMIN — GABAPENTIN 300 MG: 300 CAPSULE ORAL at 21:11

## 2022-07-26 NOTE — ED PROVIDER NOTES
History  Chief Complaint   Patient presents with    Seizure - Prior Hx Of     Had multiple episodes of seizure-like activity with R arm shaking  History provided by:  Patient  Seizure Re-Evaluation  Seizure activity on arrival: no    Seizure type: Focal  Preceding symptoms comment:  Denies prodromal symptoms  Initial focality:  Right-sided  Episode characteristics: abnormal movements, focal shaking and unresponsiveness    Episode characteristics: no confusion, no disorientation, no eye deviation and no tongue biting    Postictal symptoms: memory loss    Postictal symptoms: no confusion and no somnolence    Return to baseline: yes    Severity:  Mild  Timing:  Clustered  Progression:  Improving  Recent head injury:  No recent head injuries  PTA treatment:  None  History of seizures: yes       59-year-old female  History of hypertension, questionable history of seizures  Presents to the emergency department following multiple episodes of seizure-like activity  She states throughout the day, her right arm with start to shake followed by head shaking  Denies tongue biting/urinary incontinence  She adds that sometimes she will become unresponsive but others times she will remember the episode  Was previously evaluated by Neurology who prescribed a course of gabapentin  She states that these episodes resolved after her lumbar back surgery 1 year ago  This is the 1st seizure-like activity episode since her back surgery  Has not been taking gabapentin for approximately 1 year  Denies head injury  States that she has been feeling well otherwise  Prior to Admission Medications   Prescriptions Last Dose Informant Patient Reported? Taking?    Ergocalciferol (VITAMIN D2 PO)  Self Yes Yes   Sig: Take 50,000 Units by mouth   atenolol (TENORMIN) 50 mg tablet  Self Yes Yes   Sig: Take 50 mg by mouth 2 (two) times a day   celecoxib (CeleBREX) 200 mg capsule Not Taking at Unknown time  No No   Sig: TAKE 1 CAPSULE BY MOUTH TWICE A DAY   Patient not taking: Reported on 7/26/2022   famotidine (PEPCID) 20 mg tablet  Self Yes Yes   Sig: Take 20 mg by mouth 2 (two) times a day   gabapentin (NEURONTIN) 300 mg capsule Not Taking at Unknown time  No No   Sig: Take 1 capsule (300 mg total) by mouth 3 (three) times a day   Patient not taking: Reported on 7/26/2022      Facility-Administered Medications: None       Past Medical History:   Diagnosis Date    Hypertension        Past Surgical History:   Procedure Laterality Date    CHOLECYSTECTOMY      EPIDURAL BLOCK INJECTION Right 3/4/2021    Procedure: L3 and L4 TRANSFORAMINAL EPIDURAL STEROID INJECTION;  Surgeon: Michael Oconnell MD;  Location: [facility];  Service: Pain Management     GALLBLADDER SURGERY  2017       Family History   Problem Relation Age of Onset    Coronary artery disease Mother     Cancer Father      I have reviewed and agree with the history as documented  E-Cigarette/Vaping    E-Cigarette Use Never User      E-Cigarette/Vaping Substances     Social History     Tobacco Use    Smoking status: Current Every Day Smoker     Packs/day: 0 50     Years: 30 00     Pack years: 15 00    Smokeless tobacco: Never Used   Vaping Use    Vaping Use: Never used   Substance Use Topics    Alcohol use: Not Currently    Drug use: Not Currently       Review of Systems   Constitutional: Negative for chills, diaphoresis and fever  HENT: Negative for congestion, rhinorrhea, sinus pressure, sinus pain and sore throat  Eyes: Negative for pain and visual disturbance  Respiratory: Negative for cough, chest tightness, shortness of breath and wheezing  Cardiovascular: Negative for chest pain, palpitations and leg swelling  Gastrointestinal: Negative for abdominal pain, diarrhea, nausea and vomiting  Genitourinary: Negative for difficulty urinating, dysuria, flank pain and urgency  Musculoskeletal: Negative for back pain, myalgias and neck pain     Skin: Negative for color change, rash and wound  Neurological: Positive for seizures and syncope  Negative for dizziness, weakness, light-headedness and headaches  Hematological: Does not bruise/bleed easily  Psychiatric/Behavioral: Negative for confusion and sleep disturbance  All other systems reviewed and are negative  Physical Exam  Physical Exam  Vitals and nursing note reviewed  Constitutional:       General: She is not in acute distress  Appearance: She is not ill-appearing or toxic-appearing  HENT:      Head: Normocephalic and atraumatic  Right Ear: External ear normal       Left Ear: External ear normal       Nose: No congestion or rhinorrhea  Mouth/Throat:      Mouth: Mucous membranes are moist       Pharynx: Oropharynx is clear  No oropharyngeal exudate or posterior oropharyngeal erythema  Comments: No tongue biting  Eyes:      General:         Right eye: No discharge  Left eye: No discharge  Extraocular Movements: Extraocular movements intact  Conjunctiva/sclera: Conjunctivae normal    Cardiovascular:      Rate and Rhythm: Normal rate and regular rhythm  Pulses: Normal pulses  Heart sounds: Normal heart sounds  No murmur heard  Pulmonary:      Effort: Pulmonary effort is normal  No respiratory distress  Breath sounds: Normal breath sounds  No stridor  No wheezing, rhonchi or rales  Chest:      Chest wall: No tenderness  Abdominal:      General: Abdomen is flat  Bowel sounds are normal  There is no distension  Palpations: Abdomen is soft  There is no mass  Tenderness: There is no abdominal tenderness  There is no right CVA tenderness, left CVA tenderness, guarding or rebound  Hernia: No hernia is present  Genitourinary:     Comments: No urinary incontinence  Musculoskeletal:         General: No swelling, tenderness, deformity or signs of injury  Cervical back: Neck supple  No tenderness  Right lower leg: No edema        Left lower leg: No edema  Skin:     General: Skin is warm and dry  Capillary Refill: Capillary refill takes less than 2 seconds  Coloration: Skin is not jaundiced or pale  Findings: No bruising, erythema or rash  Neurological:      General: No focal deficit present  Mental Status: She is alert and oriented to person, place, and time  Cranial Nerves: No cranial nerve deficit  Sensory: No sensory deficit  Motor: No weakness  Psychiatric:         Mood and Affect: Mood normal          Behavior: Behavior normal          Thought Content:  Thought content normal          Judgment: Judgment normal          Vital Signs  ED Triage Vitals   Temperature Pulse Respirations Blood Pressure SpO2   07/26/22 1907 07/26/22 1907 07/26/22 1907 07/26/22 1907 07/26/22 1907   98 3 °F (36 8 °C) 67 18 166/89 99 %      Temp Source Heart Rate Source Patient Position - Orthostatic VS BP Location FiO2 (%)   07/26/22 1907 07/26/22 1907 07/26/22 2000 07/26/22 1907 --   Temporal Monitor Sitting Right arm       Pain Score       07/26/22 1907       4           Vitals:    07/26/22 1907 07/26/22 2000 07/26/22 2030 07/26/22 2100   BP: 166/89 164/100 156/93 153/90   Pulse: 67 63 62 61   Patient Position - Orthostatic VS:  Sitting Sitting Lying     ED Medications  Medications   gabapentin (NEURONTIN) capsule 300 mg (300 mg Oral Given 7/26/22 2111)       Diagnostic Studies  Results Reviewed     Procedure Component Value Units Date/Time    CKMB [[id number]]  (Normal) Collected: 07/26/22 1918    Lab Status: Final result Specimen: Blood from Arm, Left Updated: 07/26/22 2037     CK-MB Index <1 0 %      CK-MB 1 0 ng/mL     Urine Microscopic [[id number]]  (Normal) Collected: 07/26/22 1921    Lab Status: Final result Specimen: Urine, Clean Catch Updated: 07/26/22 2011     RBC, UA 2-4 /hpf      WBC, UA None Seen /hpf      Epithelial Cells Occasional /hpf      Bacteria, UA None Seen /hpf     Basic metabolic panel [[id number]] Collected: 07/26/22 1918    Lab Status: Final result Specimen: Blood from Arm, Left Updated: 07/26/22 2007     Sodium 139 mmol/L      Potassium 4 2 mmol/L      Chloride 106 mmol/L      CO2 25 mmol/L      ANION GAP 8 mmol/L      BUN 20 mg/dL      Creatinine 0 85 mg/dL      Glucose 100 mg/dL      Calcium 8 6 mg/dL      eGFR 79 ml/min/1 73sq m     Narrative:      [place] guidelines for Chronic Kidney Disease (CKD):     Stage 1 with normal or high GFR (GFR > 90 mL/min/1 73 square meters)    Stage 2 Mild CKD (GFR = 60-89 mL/min/1 73 square meters)    Stage 3A Moderate CKD (GFR = 45-59 mL/min/1 73 square meters)    Stage 3B Moderate CKD (GFR = 30-44 mL/min/1 73 square meters)    Stage 4 Severe CKD (GFR = 15-29 mL/min/1 73 square meters)    Stage 5 End Stage CKD (GFR <15 mL/min/1 73 square meters)  Note: GFR calculation is accurate only with a steady state creatinine    Magnesium [[id number]]  (Normal) Collected: 07/26/22 1918    Lab Status: Final result Specimen: Blood from Arm, Left Updated: 07/26/22 2007     Magnesium 1 9 mg/dL     UA w Reflex to Microscopic w Reflex to Culture [[id number]]  (Abnormal) Collected: 07/26/22 1921    Lab Status: Final result Specimen: Urine, Clean Catch Updated: 07/26/22 1955     Color, UA Yellow     Clarity, UA Clear     Specific Gravity, UA 1 015     pH, UA 5 5     Leukocytes, UA Negative     Nitrite, UA Negative     Protein, UA Negative mg/dl      Glucose, UA Negative mg/dl      Ketones, UA Negative mg/dl      Urobilinogen, UA 0 2 E U /dl      Bilirubin, UA Negative     Occult Blood, UA Small    Lactic acid, plasma [[id number]]  (Normal) Collected: 07/26/22 1918    Lab Status: Final result Specimen: Blood from Arm, Left Updated: 07/26/22 1944     LACTIC ACID 1 2 mmol/L     Narrative:      Result may be elevated if tourniquet was used during collection      CK (with reflex to MB) [[id number]]  (Normal) Collected: 07/26/22 1918    Lab Status: Final result Specimen: Blood from Arm, Left Updated: 07/26/22 1940     Total  U/L     CBC and differential [[id number]]  (Abnormal) Collected: 07/26/22 1918    Lab Status: Final result Specimen: Blood from Arm, Left Updated: 07/26/22 1924     WBC 10 70 Thousand/uL      RBC 4 54 Million/uL      Hemoglobin 13 6 g/dL      Hematocrit 41 6 %      MCV 92 fL      MCH 30 0 pg      MCHC 32 7 g/dL      RDW 14 1 %      MPV 10 0 fL      Platelets 453 Thousands/uL      nRBC 0 /100 WBCs      Neutrophils Relative 67 %      Immat GRANS % 0 %      Lymphocytes Relative 23 %      Monocytes Relative 6 %      Eosinophils Relative 3 %      Basophils Relative 1 %      Neutrophils Absolute 7 15 Thousands/µL      Immature Grans Absolute 0 03 Thousand/uL      Lymphocytes Absolute 2 50 Thousands/µL      Monocytes Absolute 0 62 Thousand/µL      Eosinophils Absolute 0 34 Thousand/µL      Basophils Absolute 0 06 Thousands/µL     Fingerstick Glucose (POCT) [[id number]]  (Normal) Collected: 07/26/22 1914    Lab Status: Final result Updated: 07/26/22 1915     POC Glucose 83 mg/dl              CT head without contrast   ED Interpretation by Lisa Rocha DO (07/26 1938)   No acute, obvious abnormalities noted on head CT      Final Result by Azael Damian MD (07/26 2024)      Nonspecific areas of hypoattenuation in the left lentiform nucleus subcortical white matter of the left frontal lobe  Contrast-enhanced MRI of the brain is advised for further assessment in this patient with recurrent right-sided symptoms  The study was marked in [facility] for immediate notification  Workstation performed: [id number]                Procedures  ECG 12 Lead Documentation Only    Date/Time: 7/26/2022 7:12 PM  Performed by: Lisa Rocha DO  Authorized by:  Lisa Rocha DO     Indications / Diagnosis:  ?Seizure-like activity  ECG reviewed by me, the ED Provider: yes    Patient location:  ED  Previous ECG:     Previous ECG:  Compared to current    Comparison ECG info: March 5, 2021    Similarity:  No change  Interpretation:     Interpretation: normal    Rate:     ECG rate:  66    ECG rate assessment: normal    Rhythm:     Rhythm: sinus rhythm    Ectopy:     Ectopy: none    QRS:     QRS axis:  Normal    QRS intervals:  Normal  Conduction:     Conduction: normal    ST segments:     ST segments:  Normal  T waves:     T waves: normal        ED Course  ED Course as of 07/26/22 2230 Tue Jul 26, 2022 2032 No significant laboratory abnormalities  CT imaging showed nonspecific areas of hypoattenuation in the left lentiform nucleus subcortical white matter of the left frontal lobe  Reaching out to epileptologist for further recommendations  2108 Spoke with on-call neurologist, Dr Evgeny Rico  Recommends outpatient neurology follow-up along with restarting gabapentin 300 mg TID     2125 No recurrent episodes of involuntary movement/seizure-like activity while in the ED  As mentioned above, there are nonspecific CT findings  The case discussed with on-call neurologist who recommended restarting gabapentin  DMV filled out and submitted  The patient was advised not to drive until follow-up with neurology  She expressed understanding  Return precautions were discussed  All questions were addressed  The patient was stable for discharge  MDM    Disposition  Final diagnoses:   Seizure-like activity ([address] )     Time reflects when diagnosis was documented in both MDM as applicable and the Disposition within this note     Time User Action Codes Description Comment    7/26/2022  9:17 PM Tamie Peoples Add [R56 9] Seizure-like activity [facility])       ED Disposition     ED Disposition   Discharge    Condition   Stable    Date/Time   Tue Jul 26, 2022  9:08 PM    Comment   Pepito Altamirano discharge to home/self care                 Follow-up Information     Follow up With Specialties Details Why Contact Info    Carson Reed MD Neurology In 1 week  [address]  [address]  [phone] Discharge Medication List as of 7/26/2022  9:19 PM      START taking these medications    Details   !! gabapentin (Neurontin) 300 mg capsule Take 1 capsule (300 mg total) by mouth 3 (three) times a day, Starting Tue 7/26/2022, Normal       !! - Potential duplicate medications found  Please discuss with provider  CONTINUE these medications which have NOT CHANGED    Details   atenolol (TENORMIN) 50 mg tablet Take 50 mg by mouth 2 (two) times a day, Starting Sun 12/13/2020, Historical Med      Ergocalciferol (VITAMIN D2 PO) Take 50,000 Units by mouth, Historical Med      famotidine (PEPCID) 20 mg tablet Take 20 mg by mouth 2 (two) times a day, Historical Med      celecoxib (CeleBREX) 200 mg capsule TAKE 1 CAPSULE BY MOUTH TWICE A DAY, Normal      !! gabapentin (NEURONTIN) 300 mg capsule Take 1 capsule (300 mg total) by mouth 3 (three) times a day, Starting Mon 6/14/2021, Normal       !! - Potential duplicate medications found  Please discuss with provider                PDMP Review       Value Time User    PDMP Reviewed  Yes 2/17/2021  3:31 PM Danyelle Angel MD          ED Provider  Electronically Signed by           Laurie Flores DO  07/26/22 3501

## 2022-07-27 LAB
ATRIAL RATE: 66 BPM
P AXIS: 50 DEGREES
PR INTERVAL: 158 MS
QRS AXIS: 76 DEGREES
QRSD INTERVAL: 82 MS
QT INTERVAL: 398 MS
QTC INTERVAL: 417 MS
T WAVE AXIS: 63 DEGREES
VENTRICULAR RATE: 66 BPM

## 2022-07-27 NOTE — ED NOTES
Patient took all monitoring equipment off and told the PCA they wished to sign out AMA, advised provider of the same       [facility]  07/26/22 9842

## 2022-07-27 NOTE — DISCHARGE INSTRUCTIONS
You are being prescribed a course of gabapentin  Please take as directed  A referral to Neurology was provided  Call the office tomorrow to schedule an appointment  Do not hesitate to be re-evaluated in the ED for any concerning signs or symptoms

## 2023-06-11 ENCOUNTER — HOSPITAL ENCOUNTER (EMERGENCY)
Facility: HOSPITAL | Age: 53
Discharge: HOME | End: 2023-06-11
Attending: EMERGENCY MEDICINE
Payer: MEDICARE

## 2023-06-11 VITALS
BODY MASS INDEX: 24.84 KG/M2 | OXYGEN SATURATION: 97 % | SYSTOLIC BLOOD PRESSURE: 184 MMHG | RESPIRATION RATE: 18 BRPM | HEART RATE: 70 BPM | HEIGHT: 59 IN | TEMPERATURE: 97.9 F | DIASTOLIC BLOOD PRESSURE: 109 MMHG | WEIGHT: 123.24 LBS

## 2023-06-11 DIAGNOSIS — M54.16 LUMBAR RADICULOPATHY: ICD-10-CM

## 2023-06-11 DIAGNOSIS — M62.830 SPASM OF MUSCLE OF LOWER BACK: ICD-10-CM

## 2023-06-11 DIAGNOSIS — J01.90 ACUTE SINUSITIS: Primary | ICD-10-CM

## 2023-06-11 PROCEDURE — 99283 EMERGENCY DEPT VISIT LOW MDM: CPT

## 2023-06-11 RX ORDER — DIAZEPAM 5 MG/1
5 TABLET ORAL EVERY 12 HOURS PRN
Qty: 20 TABLET | Refills: 0 | Status: SHIPPED | OUTPATIENT
Start: 2023-06-11 | End: 2023-06-21

## 2023-06-11 RX ORDER — DIAZEPAM 5 MG/1
5 TABLET ORAL ONCE
Status: COMPLETED | OUTPATIENT
Start: 2023-06-11 | End: 2023-06-11

## 2023-06-11 RX ORDER — AMOXICILLIN AND CLAVULANATE POTASSIUM 875; 125 MG/1; MG/1
1 TABLET, FILM COATED ORAL ONCE
Status: COMPLETED | OUTPATIENT
Start: 2023-06-11 | End: 2023-06-11

## 2023-06-11 RX ORDER — AMOXICILLIN AND CLAVULANATE POTASSIUM 875; 125 MG/1; MG/1
1 TABLET, FILM COATED ORAL EVERY 12 HOURS
Qty: 20 TABLET | Refills: 0 | Status: SHIPPED | OUTPATIENT
Start: 2023-06-11 | End: 2023-06-21

## 2023-06-11 RX ADMIN — DIAZEPAM 5 MG: 5 TABLET ORAL at 12:57

## 2023-06-11 RX ADMIN — AMOXICILLIN AND CLAVULANATE POTASSIUM 1 TABLET: 875; 125 TABLET, FILM COATED ORAL at 12:57

## 2023-06-11 NOTE — ED PROVIDER NOTES
History  Chief Complaint   Patient presents with   • Medical Problem     Pt arrives reporting she thinks she has a sinus infection for past week  Pt also reporting left leg weakness that started today and she believes it is due to previous back surgery  Patient is a 59-year-old female presents the emergency department complaining of sinus infection reports symptoms of sinus discharge postnasal drip and facial pain and pressure on the bilateral cheeks and forehead started about 1 week ago if still present worsening patient reports taking amoxicillin in the past for sinus infections with good relief of symptoms and is requesting this  Patient also complains of chronic low back pain with pain and spasms in the left leg which she feels are associated to her back problems and prior back surgery  History provided by:  Patient and spouse      Prior to Admission Medications   Prescriptions Last Dose Informant Patient Reported? Taking?    Ergocalciferol (VITAMIN D2 PO)  Self Yes No   Sig: Take 50,000 Units by mouth   atenolol (TENORMIN) 50 mg tablet  Self Yes No   Sig: Take 50 mg by mouth 2 (two) times a day   celecoxib (CeleBREX) 200 mg capsule   No No   Sig: TAKE 1 CAPSULE BY MOUTH TWICE A DAY   Patient not taking: Reported on 7/26/2022   famotidine (PEPCID) 20 mg tablet  Self Yes No   Sig: Take 20 mg by mouth 2 (two) times a day   gabapentin (NEURONTIN) 300 mg capsule   No No   Sig: Take 1 capsule (300 mg total) by mouth 3 (three) times a day   Patient not taking: Reported on 7/26/2022   gabapentin (Neurontin) 300 mg capsule   No No   Sig: Take 1 capsule (300 mg total) by mouth 3 (three) times a day      Facility-Administered Medications: None       Past Medical History:   Diagnosis Date   • Hypertension        Past Surgical History:   Procedure Laterality Date   • BACK SURGERY     • CHOLECYSTECTOMY     • EPIDURAL BLOCK INJECTION Right 03/04/2021    Procedure: L3 and L4 TRANSFORAMINAL EPIDURAL STEROID INJECTION;  Surgeon: Irina Spicer MD;  Location: [facility];  Service: Pain Management    • GALLBLADDER SURGERY  2017       Family History   Problem Relation Age of Onset   • Coronary artery disease Mother    • Cancer Father      I have reviewed and agree with the history as documented  E-Cigarette/Vaping   • E-Cigarette Use Never User      E-Cigarette/Vaping Substances     Social History     Tobacco Use   • Smoking status: Every Day     Packs/day: 0 50     Years: 30 00     Total pack years: 15 00     Types: Cigarettes   • Smokeless tobacco: Never   Vaping Use   • Vaping Use: Never used   Substance Use Topics   • Alcohol use: Not Currently   • Drug use: Not Currently       Review of Systems   Constitutional: Negative for activity change, appetite change, chills, fatigue and fever  HENT: Positive for congestion, postnasal drip, sinus pressure and sinus pain  Negative for ear pain, rhinorrhea and sore throat  Eyes: Negative for discharge, redness and visual disturbance  Respiratory: Negative for cough, chest tightness, shortness of breath and wheezing  Cardiovascular: Negative for chest pain and palpitations  Gastrointestinal: Negative for abdominal pain, constipation, diarrhea, nausea and vomiting  Endocrine: Negative for polydipsia and polyuria  Genitourinary: Negative for difficulty urinating, dysuria, frequency, hematuria and urgency  Musculoskeletal: Positive for back pain  Negative for arthralgias and myalgias  Left leg stiffness and spasm   Skin: Negative for color change, pallor and rash  Neurological: Negative for dizziness, weakness, light-headedness, numbness and headaches  Hematological: Negative for adenopathy  Does not bruise/bleed easily  All other systems reviewed and are negative  Physical Exam  Physical Exam  Vitals and nursing note reviewed  Constitutional:       Appearance: She is well-developed  HENT:      Head: Normocephalic and atraumatic        Right Ear: External ear normal       Left Ear: External ear normal       Nose: Mucosal edema and congestion present  Right Sinus: Maxillary sinus tenderness and frontal sinus tenderness present  Left Sinus: Maxillary sinus tenderness and frontal sinus tenderness present  Eyes:      Conjunctiva/sclera: Conjunctivae normal       Pupils: Pupils are equal, round, and reactive to light  Cardiovascular:      Rate and Rhythm: Normal rate and regular rhythm  Heart sounds: Normal heart sounds  Pulmonary:      Effort: Pulmonary effort is normal  No respiratory distress  Breath sounds: Normal breath sounds  No wheezing or rales  Chest:      Chest wall: No tenderness  Abdominal:      General: Bowel sounds are normal  There is no distension  Palpations: Abdomen is soft  Tenderness: There is no abdominal tenderness  There is no guarding  Musculoskeletal:         General: Normal range of motion  Cervical back: Normal range of motion and neck supple  Skin:     General: Skin is warm and dry  Neurological:      Mental Status: She is alert and oriented to person, place, and time  Cranial Nerves: No cranial nerve deficit  Sensory: No sensory deficit           Vital Signs  ED Triage Vitals [06/11/23 1247]   Temperature Pulse Respirations Blood Pressure SpO2   97 9 °F (36 6 °C) 70 18 (!) 184/109 97 %      Temp src Heart Rate Source Patient Position - Orthostatic VS BP Location FiO2 (%)   -- -- -- -- --      Pain Score       8           Vitals:    06/11/23 1247   BP: (!) 184/109   Pulse: 70         Visual Acuity      ED Medications  Medications   diazepam (VALIUM) tablet 5 mg (5 mg Oral Given 6/11/23 1257)   amoxicillin-clavulanate (AUGMENTIN) 875-125 mg per tablet 1 tablet (1 tablet Oral Given 6/11/23 1257)       Diagnostic Studies  Results Reviewed     None                 No orders to display              Procedures  Procedures         ED Course  ED Course as of 06/11/23 1315   [facility] Jun 11, 2023   1315 Repeat blood pressure 135/92  Medical Decision Making  Patient is afebrile nontoxic well-appearing clinically and hemodynamically stable in the emergency department  History and examination consistent with acute sinusitis ongoing for 1 week as well as exacerbation of chronic low back pain and spasm with left-sided radicular symptoms  We will treat with muscle relaxers patient advised to continue NSAIDs as she has been taking ibuprofen and recommended Augmentin for sinus infection advise supportive care and prompt follow-up with primary physician and pain spine medicine referral provided for further evaluation and treatment  return precautions and anticipatory guidance discussed  Acute sinusitis: acute illness or injury  Spasm of muscle of lower back: acute illness or injury  Risk  Prescription drug management  Disposition  Final diagnoses:   Acute sinusitis   Spasm of muscle of lower back   Lumbar radiculopathy - Right     Time reflects when diagnosis was documented in both MDM as applicable and the Disposition within this note     Time User Action Codes Description Comment    6/11/2023 12:53 PM Camron Echevarria [J01 90] Acute sinusitis     6/11/2023 12:54 PM Camron Knight Add [M62 830] Spasm of muscle of lower back     6/11/2023  1:01 PM Camron Knight Add [B33 10] Lumbar radiculopathy - Right       ED Disposition     ED Disposition   Discharge    Condition   Stable    Date/Time   [place] Jun 11, 2023 12:53 PM    [address] discharge to home/self care                 Follow-up Information     Follow up With Specialties Details Why Contact Info Additional Information      Schedule an appointment as soon as possible for a visit in 3 days  your pcp     [facility] Schedule an appointment as soon as possible for a visit in 3 days  [address] [facility] [place] [address]  [facility] [address], [place], [place], [address] [phone]          Patient's Medications   Discharge Prescriptions    AMOXICILLIN-CLAVULANATE (AUGMENTIN) 875-125 MG PER TABLET    Take 1 tablet by mouth every 12 (twelve) hours for 10 days       Start Date: 6/11/2023 End Date: 6/21/2023       Order Dose: 1 tablet       Quantity: 20 tablet    Refills: 0    DIAZEPAM (VALIUM) 5 MG TABLET    Take 1 tablet (5 mg total) by mouth every 12 (twelve) hours as needed for muscle spasms for up to 10 days       Start Date: 6/11/2023 End Date: 6/21/2023       Order Dose: 5 mg       Quantity: 20 tablet    Refills: 0           PDMP Review       Value Time User    PDMP Reviewed  Yes 2/17/2021  3:31 PM Brady Butler MD          ED Provider  Electronically Signed by           Harsha Yu DO  06/11/23 9470

## (undated) DEVICE — DRAPE SHEET THREE QUARTER

## (undated) DEVICE — STERILE LATEX POWDER-FREE SURGICAL GLOVESWITH NITRILE COATING: Brand: PROTEXIS

## (undated) DEVICE — PLASTIC ADHESIVE BANDAGE: Brand: CURITY

## (undated) DEVICE — UTILITY MARKER,BLACK WITH LABELS: Brand: DEVON

## (undated) DEVICE — IV EXTENSION TUBING SMALL BORE

## (undated) DEVICE — SYRINGE 5ML LL

## (undated) DEVICE — CHLORAPREP HI-LITE 10.5ML ORANGE

## (undated) DEVICE — NEEDLE SPINAL 22G X 3.5IN  QUINCKE

## (undated) DEVICE — GAUZE SPONGES,USP TYPE VII GAUZE, 12 PLY: Brand: CURITY

## (undated) DEVICE — DRAPE TOWEL: Brand: CONVERTORS

## (undated) DEVICE — SYRINGE 10ML LL